# Patient Record
Sex: MALE | Race: OTHER | HISPANIC OR LATINO | URBAN - METROPOLITAN AREA
[De-identification: names, ages, dates, MRNs, and addresses within clinical notes are randomized per-mention and may not be internally consistent; named-entity substitution may affect disease eponyms.]

---

## 2020-12-10 ENCOUNTER — EMERGENCY (EMERGENCY)
Facility: HOSPITAL | Age: 45
LOS: 1 days | Discharge: HOME | End: 2020-12-10
Attending: EMERGENCY MEDICINE | Admitting: EMERGENCY MEDICINE
Payer: MEDICAID

## 2020-12-10 VITALS
TEMPERATURE: 98 F | HEART RATE: 95 BPM | OXYGEN SATURATION: 98 % | SYSTOLIC BLOOD PRESSURE: 181 MMHG | RESPIRATION RATE: 16 BRPM | DIASTOLIC BLOOD PRESSURE: 99 MMHG

## 2020-12-10 VITALS — SYSTOLIC BLOOD PRESSURE: 198 MMHG | DIASTOLIC BLOOD PRESSURE: 113 MMHG

## 2020-12-10 PROCEDURE — 99282 EMERGENCY DEPT VISIT SF MDM: CPT

## 2020-12-10 RX ORDER — ACETAMINOPHEN 500 MG
650 TABLET ORAL ONCE
Refills: 0 | Status: COMPLETED | OUTPATIENT
Start: 2020-12-10 | End: 2020-12-10

## 2020-12-10 RX ORDER — LIDOCAINE 4 G/100G
1 CREAM TOPICAL ONCE
Refills: 0 | Status: COMPLETED | OUTPATIENT
Start: 2020-12-10 | End: 2020-12-10

## 2020-12-10 RX ADMIN — Medication 650 MILLIGRAM(S): at 14:12

## 2020-12-10 RX ADMIN — LIDOCAINE 1 PATCH: 4 CREAM TOPICAL at 14:12

## 2020-12-10 NOTE — ED PROVIDER NOTE - PATIENT PORTAL LINK FT
You can access the FollowMyHealth Patient Portal offered by [facility] by registering at the following website: [email or web address]. By joining [facility]’s FollowMyHealth portal, you will also be able to view your health information using other applications (apps) compatible with our system.

## 2020-12-10 NOTE — ED PROVIDER NOTE - NSFOLLOWUPINSTRUCTIONS_ED_ALL_ED_FT
1) Follow up with your doctor within 1-3 days of being seen in the Emergency Department   2) Return to the ED immediately for new or worsening symptoms sudden severe headache, blurry vision, difficulty walking, weakness of one side of the body, slurred speech  3) Please continue to take any home medications as prescribed  4) Your test results from your ED visit were discussed with you prior to discharge  5) You were provided with a copy of your test results  6) You can buy Salonpas Lidocaine Patches over the counter at the pharmacy. Please take Tylenol for headache also.

## 2020-12-10 NOTE — ED ADULT NURSE NOTE - OBJECTIVE STATEMENT
lunch cov: pt received to 10B, c.o. pain going from behind lt ear down into neck. denies trauma and fevers, medicated as ordered. pt ambulatory in area

## 2020-12-10 NOTE — ED PROVIDER NOTE - CLINICAL SUMMARY MEDICAL DECISION MAKING FREE TEXT BOX
Li: 2 days L neck pain starting while he was sleeping. Associated with mild posterior HA. Alert and oriented, no gross motor or sensory deficits. Unlikely neurovascular event. Has been exercising more (weights). Likely muscle spasm/pain. Give pain meds. Re-assess.

## 2020-12-10 NOTE — ED ADULT TRIAGE NOTE - CHIEF COMPLAINT QUOTE
pt c/o left sided neck pain that started two days ago. Pt states he also had a headache but the headache went away at this time. Pt only feels the pain when he touches his neck. Pt sent from urgent care for further eval. PMH: HTN (pt took 3 different meds for his BP this AM).

## 2020-12-10 NOTE — ED PROVIDER NOTE - OBJECTIVE STATEMENT
45 year old male with a pmhx of HTN and HLD, is sent to ED from Urgent Care for evaluation of headache/neck pain x2 days. Patient reports symptoms began after exercising and lifting weights at a gym. The  report states they were concerned with the elevated BP. Patient has a hx of HTN and is on 3 BP meds. He states he has been taking all his meds as prescribed. He denies any blurry vision, weakness on 1 side or the other, chest pain, sob, dizziness, or difficulty walking.

## 2020-12-10 NOTE — ED PROVIDER NOTE - NS ED ROS FT
General: no fever, no chills  Eyes: no vision changes, no eye pain  Cardiovascular: no chest pain, no edema  Respiratory: no cough, no shortness of breath  Gastrointestinal: no abdominal pain, no nausea, no vomiting, no diarrhea  Genitourinary: no dysuria, no hematuria  Musculoskeletal: no muscle pain, no joint pain  Skin: no rash, no lesions  Neuro: no numbness, no tingling, +HA, neck pain  Psych: no depression, no anxiety

## 2020-12-10 NOTE — ED PROVIDER NOTE - ATTENDING CONTRIBUTION TO CARE
I performed a face-to-face evaluation of the patient and performed a history and physical examination. I agree with the history and physical examination.    2 days L neck pain starting while he was sleeping. Associated with mild posterior HA. Alert and oriented, no gross motor or sensory deficits. Unlikely neurovascular event. Has been exercising more (weights). Likely muscle spasm/pain. Give pain meds. Re-assess.

## 2020-12-10 NOTE — ED PROVIDER NOTE - PHYSICAL EXAMINATION
General: well appearing, no acute distress, AOx3  Skin: normal color for race, no rash  Head: normocephalic, atraumatic  Eyes: clear conjunctiva, EOMI  ENMT: airway patent, no nasal discharge  Cardiovascular: normal rate, normal rhythm, S1/S2  Pulmonary: clear to auscultation bilaterally, no rales, rhonchi, or wheeze  Abdomen: soft, nontender  Musculoskeletal: moving extremities well, no deformity, point tenderness at left lateral scalene, pain with looking left. normal ROM of neck in all directions. no meningeal sign   Neuro: CN II-XII intact, 5/5 strength b/l upper and lower extremities, normal gait   Psych: normal mood, normal affect

## 2020-12-10 NOTE — ED PROVIDER NOTE - PROGRESS NOTE DETAILS
Navin Kim, PGY-1- Patient improved after Tylenol and Lidoderm patch. Will d/c home. Strict return precautions given. All questions answered regarding plan Navin Kim, PGY-1- Patient revitaled numerous times, BP elevated, patient reporting this is not unusual for him. Patient took home meds. Advised to f/u with cardiologist. Feels well, no neuro deficits, neck pain/headache improved. Will d/c patient. Strict return precautions given

## 2021-11-30 ENCOUNTER — INPATIENT (INPATIENT)
Facility: HOSPITAL | Age: 46
LOS: 2 days | Discharge: HOME | End: 2021-12-03
Attending: INTERNAL MEDICINE | Admitting: INTERNAL MEDICINE
Payer: MEDICAID

## 2021-11-30 VITALS
SYSTOLIC BLOOD PRESSURE: 167 MMHG | RESPIRATION RATE: 18 BRPM | OXYGEN SATURATION: 100 % | HEART RATE: 90 BPM | TEMPERATURE: 98 F | DIASTOLIC BLOOD PRESSURE: 95 MMHG

## 2021-11-30 LAB
ALBUMIN SERPL ELPH-MCNC: 4.3 G/DL — SIGNIFICANT CHANGE UP (ref 3.3–5)
ALP SERPL-CCNC: 75 U/L — SIGNIFICANT CHANGE UP (ref 40–120)
ALT FLD-CCNC: 32 U/L — SIGNIFICANT CHANGE UP (ref 4–41)
ANION GAP SERPL CALC-SCNC: 9 MMOL/L — SIGNIFICANT CHANGE UP (ref 7–14)
AST SERPL-CCNC: 28 U/L — SIGNIFICANT CHANGE UP (ref 4–40)
BASOPHILS # BLD AUTO: 0.05 K/UL — SIGNIFICANT CHANGE UP (ref 0–0.2)
BASOPHILS NFR BLD AUTO: 0.8 % — SIGNIFICANT CHANGE UP (ref 0–2)
BILIRUB SERPL-MCNC: 0.6 MG/DL — SIGNIFICANT CHANGE UP (ref 0.2–1.2)
BUN SERPL-MCNC: 14 MG/DL — SIGNIFICANT CHANGE UP (ref 7–23)
CALCIUM SERPL-MCNC: 9.3 MG/DL — SIGNIFICANT CHANGE UP (ref 8.4–10.5)
CHLORIDE SERPL-SCNC: 103 MMOL/L — SIGNIFICANT CHANGE UP (ref 98–107)
CO2 SERPL-SCNC: 27 MMOL/L — SIGNIFICANT CHANGE UP (ref 22–31)
CREAT SERPL-MCNC: 1.22 MG/DL — SIGNIFICANT CHANGE UP (ref 0.5–1.3)
D DIMER BLD IA.RAPID-MCNC: <150 NG/ML DDU — SIGNIFICANT CHANGE UP
EOSINOPHIL # BLD AUTO: 0.14 K/UL — SIGNIFICANT CHANGE UP (ref 0–0.5)
EOSINOPHIL NFR BLD AUTO: 2.2 % — SIGNIFICANT CHANGE UP (ref 0–6)
GLUCOSE SERPL-MCNC: 103 MG/DL — HIGH (ref 70–99)
HCT VFR BLD CALC: 46.5 % — SIGNIFICANT CHANGE UP (ref 39–50)
HGB BLD-MCNC: 15.7 G/DL — SIGNIFICANT CHANGE UP (ref 13–17)
IANC: 4 K/UL — SIGNIFICANT CHANGE UP (ref 1.5–8.5)
IMM GRANULOCYTES NFR BLD AUTO: 0.3 % — SIGNIFICANT CHANGE UP (ref 0–1.5)
LYMPHOCYTES # BLD AUTO: 1.5 K/UL — SIGNIFICANT CHANGE UP (ref 1–3.3)
LYMPHOCYTES # BLD AUTO: 23.2 % — SIGNIFICANT CHANGE UP (ref 13–44)
MCHC RBC-ENTMCNC: 27.2 PG — SIGNIFICANT CHANGE UP (ref 27–34)
MCHC RBC-ENTMCNC: 33.8 GM/DL — SIGNIFICANT CHANGE UP (ref 32–36)
MCV RBC AUTO: 80.6 FL — SIGNIFICANT CHANGE UP (ref 80–100)
MONOCYTES # BLD AUTO: 0.75 K/UL — SIGNIFICANT CHANGE UP (ref 0–0.9)
MONOCYTES NFR BLD AUTO: 11.6 % — SIGNIFICANT CHANGE UP (ref 2–14)
NEUTROPHILS # BLD AUTO: 4 K/UL — SIGNIFICANT CHANGE UP (ref 1.8–7.4)
NEUTROPHILS NFR BLD AUTO: 61.9 % — SIGNIFICANT CHANGE UP (ref 43–77)
NRBC # BLD: 0 /100 WBCS — SIGNIFICANT CHANGE UP
NRBC # FLD: 0 K/UL — SIGNIFICANT CHANGE UP
NT-PROBNP SERPL-SCNC: 314 PG/ML — HIGH
PLATELET # BLD AUTO: 179 K/UL — SIGNIFICANT CHANGE UP (ref 150–400)
POTASSIUM SERPL-MCNC: 3.3 MMOL/L — LOW (ref 3.5–5.3)
POTASSIUM SERPL-SCNC: 3.3 MMOL/L — LOW (ref 3.5–5.3)
PROT SERPL-MCNC: 6.6 G/DL — SIGNIFICANT CHANGE UP (ref 6–8.3)
RBC # BLD: 5.77 M/UL — SIGNIFICANT CHANGE UP (ref 4.2–5.8)
RBC # FLD: 13.7 % — SIGNIFICANT CHANGE UP (ref 10.3–14.5)
SODIUM SERPL-SCNC: 139 MMOL/L — SIGNIFICANT CHANGE UP (ref 135–145)
TROPONIN T, HIGH SENSITIVITY RESULT: 21 NG/L — SIGNIFICANT CHANGE UP
WBC # BLD: 6.46 K/UL — SIGNIFICANT CHANGE UP (ref 3.8–10.5)
WBC # FLD AUTO: 6.46 K/UL — SIGNIFICANT CHANGE UP (ref 3.8–10.5)

## 2021-11-30 PROCEDURE — 71045 X-RAY EXAM CHEST 1 VIEW: CPT | Mod: 26

## 2021-11-30 PROCEDURE — 99220: CPT

## 2021-11-30 NOTE — ED PROVIDER NOTE - CLINICAL SUMMARY MEDICAL DECISION MAKING FREE TEXT BOX
46 year old male with htn, hyperlipidemia and chest pain that radiates to the back since yesterday, abnormal ekg, will check labs and get imaging.

## 2021-11-30 NOTE — ED CDU PROVIDER INITIAL DAY NOTE - ATTENDING CONTRIBUTION TO CARE
46 year old male with abnormal ekg and chest pain, placed in observation for echo and stress test with cardiology consultation.

## 2021-11-30 NOTE — ED ADULT NURSE NOTE - OBJECTIVE STATEMENT
Received patient at shift change from coni RN. Pt. A&OX4, ambulatory. Pt. presented to the ED for chest pain that has now resolved. PHx HTN and HLD. NSR on cardiac monitor. 18 G IV to R AC noted placed by dayshift RN. VSS. Respirations appear unlabored.

## 2021-11-30 NOTE — ED ADULT TRIAGE NOTE - CHIEF COMPLAINT QUOTE
Pt states has intermittent left side chest pain describes it as a pinching feeling. pt denies fever, sob ,cough.

## 2021-11-30 NOTE — ED CDU PROVIDER INITIAL DAY NOTE - MEDICAL DECISION MAKING DETAILS
46 year old male that has a history of hypertension and hyperlipidemia came to the ED because of chest pain. He started to have chest pain at 1pm yesterday and describes it as a stabbing pain that lasted until 11PM and than resolved. Pt is currently pain free. EKG in the ED with lateral T wave inversions. Plan is CDU for telemetry monitoring and an AM stress test and echocardiogram.

## 2021-11-30 NOTE — ED CDU PROVIDER INITIAL DAY NOTE - NSICDXFAMILYHX_GEN_ALL_CORE_FT
FAMILY HISTORY:  Father  Still living? Unknown  FH: hypertension, Age at diagnosis: Age Unknown    Mother  Still living? Unknown  FH: hypertension, Age at diagnosis: Age Unknown

## 2021-11-30 NOTE — ED CDU PROVIDER INITIAL DAY NOTE - OBJECTIVE STATEMENT
46 year old male that has a history of hypertension and hyperlipidemia came to the ED because of chest pain. He started to have chest pain at 1pm yesterday and describes it as a stabbing pain that lasted until 11PM and than resolved. He says it radiated to his back. The pain started again today 1 hour prior to arrival. No sob, no diaphoresis, no nausea, no vomiting, no weakness. He is pain free now.    CDU IKE Conner: Agree with above, spoke to pt with interpretor [id number]. 45 Y/O M PMH HTN HLD C/O CP which began at rest yesterday at 1PM and lasted until 11. Pt states he does not follow with a Cardiologist and has not had a stress test or echo before. EKG with inferior T wave inversions, troponin is stable. Plan is CDU for an AM stress test, pt is currently pain free.

## 2021-11-30 NOTE — ED PROVIDER NOTE - OBJECTIVE STATEMENT
#[id number] utilized. 46 year old male that has a history of hypertension and hyperlipidemia came to the ED because of chest pain. He started to have chest pain at 1pm yesterday and describes it as a stabbing pain that lasted until 11PM and than resolved. He says it radiated to his back. The pain started again today 1 hour prior to arrival. No sob, no diaphoresis, no nausea, no vomiting, no weakness. He is pain free now.

## 2021-12-01 ENCOUNTER — OUTPATIENT (OUTPATIENT)
Dept: OUTPATIENT SERVICES | Facility: HOSPITAL | Age: 46
LOS: 1 days | End: 2021-12-01
Payer: MEDICAID

## 2021-12-01 DIAGNOSIS — R94.39 ABNORMAL RESULT OF OTHER CARDIOVASCULAR FUNCTION STUDY: ICD-10-CM

## 2021-12-01 DIAGNOSIS — I10 ESSENTIAL (PRIMARY) HYPERTENSION: ICD-10-CM

## 2021-12-01 DIAGNOSIS — Z29.9 ENCOUNTER FOR PROPHYLACTIC MEASURES, UNSPECIFIED: ICD-10-CM

## 2021-12-01 DIAGNOSIS — R07.9 CHEST PAIN, UNSPECIFIED: ICD-10-CM

## 2021-12-01 PROBLEM — E78.5 HYPERLIPIDEMIA, UNSPECIFIED: Chronic | Status: ACTIVE | Noted: 2020-12-10

## 2021-12-01 LAB
ALBUMIN SERPL ELPH-MCNC: 4.3 G/DL — SIGNIFICANT CHANGE UP (ref 3.3–5)
ALP SERPL-CCNC: 68 U/L — SIGNIFICANT CHANGE UP (ref 40–120)
ALT FLD-CCNC: 29 U/L — SIGNIFICANT CHANGE UP (ref 4–41)
ANION GAP SERPL CALC-SCNC: 9 MMOL/L — SIGNIFICANT CHANGE UP (ref 7–14)
AST SERPL-CCNC: 25 U/L — SIGNIFICANT CHANGE UP (ref 4–40)
BILIRUB SERPL-MCNC: 0.6 MG/DL — SIGNIFICANT CHANGE UP (ref 0.2–1.2)
BUN SERPL-MCNC: 16 MG/DL — SIGNIFICANT CHANGE UP (ref 7–23)
CALCIUM SERPL-MCNC: 9.1 MG/DL — SIGNIFICANT CHANGE UP (ref 8.4–10.5)
CHLORIDE SERPL-SCNC: 104 MMOL/L — SIGNIFICANT CHANGE UP (ref 98–107)
CO2 SERPL-SCNC: 27 MMOL/L — SIGNIFICANT CHANGE UP (ref 22–31)
CREAT SERPL-MCNC: 1.47 MG/DL — HIGH (ref 0.5–1.3)
GLUCOSE SERPL-MCNC: 95 MG/DL — SIGNIFICANT CHANGE UP (ref 70–99)
HCT VFR BLD CALC: 47.1 % — SIGNIFICANT CHANGE UP (ref 39–50)
HGB BLD-MCNC: 15.7 G/DL — SIGNIFICANT CHANGE UP (ref 13–17)
MCHC RBC-ENTMCNC: 27.2 PG — SIGNIFICANT CHANGE UP (ref 27–34)
MCHC RBC-ENTMCNC: 33.3 GM/DL — SIGNIFICANT CHANGE UP (ref 32–36)
MCV RBC AUTO: 81.5 FL — SIGNIFICANT CHANGE UP (ref 80–100)
NRBC # BLD: 0 /100 WBCS — SIGNIFICANT CHANGE UP
NRBC # FLD: 0 K/UL — SIGNIFICANT CHANGE UP
PLATELET # BLD AUTO: 180 K/UL — SIGNIFICANT CHANGE UP (ref 150–400)
POTASSIUM SERPL-MCNC: 3.6 MMOL/L — SIGNIFICANT CHANGE UP (ref 3.5–5.3)
POTASSIUM SERPL-SCNC: 3.6 MMOL/L — SIGNIFICANT CHANGE UP (ref 3.5–5.3)
PROT SERPL-MCNC: 6.3 G/DL — SIGNIFICANT CHANGE UP (ref 6–8.3)
RBC # BLD: 5.78 M/UL — SIGNIFICANT CHANGE UP (ref 4.2–5.8)
RBC # FLD: 13.8 % — SIGNIFICANT CHANGE UP (ref 10.3–14.5)
SARS-COV-2 RNA SPEC QL NAA+PROBE: SIGNIFICANT CHANGE UP
SODIUM SERPL-SCNC: 140 MMOL/L — SIGNIFICANT CHANGE UP (ref 135–145)
TROPONIN T, HIGH SENSITIVITY RESULT: 20 NG/L — SIGNIFICANT CHANGE UP
WBC # BLD: 6.13 K/UL — SIGNIFICANT CHANGE UP (ref 3.8–10.5)
WBC # FLD AUTO: 6.13 K/UL — SIGNIFICANT CHANGE UP (ref 3.8–10.5)

## 2021-12-01 PROCEDURE — G9005: CPT

## 2021-12-01 PROCEDURE — 99217: CPT

## 2021-12-01 PROCEDURE — 93306 TTE W/DOPPLER COMPLETE: CPT | Mod: 26

## 2021-12-01 PROCEDURE — 99223 1ST HOSP IP/OBS HIGH 75: CPT

## 2021-12-01 RX ORDER — HYDRALAZINE HCL 50 MG
100 TABLET ORAL EVERY 8 HOURS
Refills: 0 | Status: DISCONTINUED | OUTPATIENT
Start: 2021-12-01 | End: 2021-12-01

## 2021-12-01 RX ORDER — AMLODIPINE BESYLATE 2.5 MG/1
10 TABLET ORAL EVERY 24 HOURS
Refills: 0 | Status: DISCONTINUED | OUTPATIENT
Start: 2021-12-01 | End: 2021-12-02

## 2021-12-01 RX ORDER — ATORVASTATIN CALCIUM 80 MG/1
40 TABLET, FILM COATED ORAL AT BEDTIME
Refills: 0 | Status: DISCONTINUED | OUTPATIENT
Start: 2021-12-01 | End: 2021-12-02

## 2021-12-01 RX ORDER — HYDRALAZINE HCL 50 MG
100 TABLET ORAL EVERY 8 HOURS
Refills: 0 | Status: DISCONTINUED | OUTPATIENT
Start: 2021-12-01 | End: 2021-12-02

## 2021-12-01 RX ORDER — LISINOPRIL 2.5 MG/1
40 TABLET ORAL DAILY
Refills: 0 | Status: DISCONTINUED | OUTPATIENT
Start: 2021-12-01 | End: 2021-12-03

## 2021-12-01 RX ORDER — ACETAMINOPHEN 500 MG
650 TABLET ORAL EVERY 6 HOURS
Refills: 0 | Status: DISCONTINUED | OUTPATIENT
Start: 2021-12-01 | End: 2021-12-03

## 2021-12-01 RX ORDER — ASPIRIN/CALCIUM CARB/MAGNESIUM 324 MG
81 TABLET ORAL DAILY
Refills: 0 | Status: DISCONTINUED | OUTPATIENT
Start: 2021-12-01 | End: 2021-12-03

## 2021-12-01 RX ORDER — ASPIRIN/CALCIUM CARB/MAGNESIUM 324 MG
81 TABLET ORAL ONCE
Refills: 0 | Status: COMPLETED | OUTPATIENT
Start: 2021-12-01 | End: 2021-12-01

## 2021-12-01 RX ORDER — POTASSIUM CHLORIDE 20 MEQ
40 PACKET (EA) ORAL ONCE
Refills: 0 | Status: COMPLETED | OUTPATIENT
Start: 2021-12-01 | End: 2021-12-01

## 2021-12-01 RX ORDER — AMLODIPINE BESYLATE AND BENAZEPRIL HYDROCHLORIDE 10; 20 MG/1; MG/1
1 CAPSULE ORAL
Qty: 0 | Refills: 0 | DISCHARGE

## 2021-12-01 RX ORDER — LABETALOL HCL 100 MG
300 TABLET ORAL EVERY 24 HOURS
Refills: 0 | Status: DISCONTINUED | OUTPATIENT
Start: 2021-12-01 | End: 2021-12-02

## 2021-12-01 RX ADMIN — Medication 40 MILLIEQUIVALENT(S): at 12:31

## 2021-12-01 RX ADMIN — ATORVASTATIN CALCIUM 40 MILLIGRAM(S): 80 TABLET, FILM COATED ORAL at 22:52

## 2021-12-01 RX ADMIN — Medication 100 MILLIGRAM(S): at 22:52

## 2021-12-01 RX ADMIN — Medication 81 MILLIGRAM(S): at 14:05

## 2021-12-01 RX ADMIN — LISINOPRIL 40 MILLIGRAM(S): 2.5 TABLET ORAL at 23:02

## 2021-12-01 NOTE — H&P ADULT - PROBLEM SELECTOR PLAN 2
Pt with chest pain. Abnormal stress test. No chest pain currently. EKG with NSR.   - Pending left heart cath tomorrow   - C/w ASA

## 2021-12-01 NOTE — ED CDU PROVIDER SUBSEQUENT DAY NOTE - PROGRESS NOTE DETAILS
IKE Garcia: 45 y/o male with a hx of HTN, HLD presented to the ER c/o chest pain.  Pt was placed in CDU for stress, echo and Tele Doc.  Received call from stress lab pt with +stress test.  Pt seen and evaluated by cardiology Dr Andrew at bedside advised to admit to his service and to give aspirin 81mg x 1 now no further AC at this time.  Pt aware and agreeable with plan.  Pt resting comfortably NAD, pt denies chest pain or any complaints at present time.  Pt aware and agreeable with plan.  D/w pt via  ID# [id number].

## 2021-12-01 NOTE — H&P ADULT - NSHPPHYSICALEXAM_GEN_ALL_CORE
.  VITAL SIGNS:  T(C): 36.8 (12-01-21 @ 14:30), Max: 37.2 (12-01-21 @ 05:02)  T(F): 98.2 (12-01-21 @ 14:30), Max: 98.9 (12-01-21 @ 05:02)  HR: 72 (12-01-21 @ 14:30) (61 - 90)  BP: 212/110 (12-01-21 @ 14:30) (134/85 - 212/110)  BP(mean): --  RR: 16 (12-01-21 @ 14:30) (13 - 18)  SpO2: 99% (12-01-21 @ 14:30) (97% - 100%)  Wt(kg): --    PHYSICAL EXAM:    Constitutional: WDWN male resting comfortably in bed; NAD  Head: NC/AT  Eyes: PERRL, EOMI, anicteric sclera  ENT: no nasal discharge; uvula midline, no oropharyngeal erythema or exudates; MMM  Neck: supple; no JVD or thyromegaly  Respiratory: CTA B/L; no W/R/R, no retractions  Cardiac: +S1/S2; RRR; no M/R/G; PMI non-displaced  Gastrointestinal: abdomen soft, NT/ND; no rebound or guarding; +BSx4  Back: spine midline, no bony tenderness or step-offs; no CVAT B/L  Extremities: WWP, no clubbing or cyanosis; no peripheral edema  Musculoskeletal: NROM x4; no joint swelling, tenderness or erythema  Vascular: 2+ radial, femoral, DP/PT pulses B/L  Dermatologic: skin warm, dry and intact; no rashes, wounds, or scars  Lymphatic: no submandibular or cervical LAD  Neurologic: AAOx3; CNII-XII grossly intact; no focal deficits  Psychiatric: affect and characteristics of appearance, verbalizations, behaviors are appropriate

## 2021-12-01 NOTE — ED CDU PROVIDER DISPOSITION NOTE - ATTENDING CONTRIBUTION TO CARE
47 y/o male with a hx of HTN, HLD presented to the ER c/o chest pain.  Pt was placed in CDU for stress, echo and Tele Doc. echo wnl,  Received call from stress lab pt with +stress test.  Pt seen and evaluated by cardiology Dr Andrew at bedside advised to admit to his service and to give aspirin 81mg x 1 now no further AC at this time.  Pt aware and agreeable with plan.  Pt resting comfortably NAD, pt denies chest pain or any complaints at present time.  Pt aware and agreeable with plan.

## 2021-12-01 NOTE — H&P ADULT - NSHPREVIEWOFSYSTEMS_GEN_ALL_CORE
REVIEW OF SYSTEMS:    CONSTITUTIONAL: No weakness, fevers or chills  EYES/ENT: No visual changes;  No vertigo or throat pain   NECK: No pain or stiffness  RESPIRATORY: No cough, wheezing, hemoptysis; No shortness of breath  CARDIOVASCULAR: (+) chest pain, no palpitations.   GASTROINTESTINAL: No abdominal or epigastric pain. No nausea, vomiting, or hematemesis; No diarrhea or constipation. No melena or hematochezia.  GENITOURINARY: No dysuria, frequency or hematuria  NEUROLOGICAL: No numbness or weakness  SKIN: No itching, rashes

## 2021-12-01 NOTE — ED CDU PROVIDER SUBSEQUENT DAY NOTE - ATTENDING CONTRIBUTION TO CARE
46 year old male that has a history of hypertension and hyperlipidemia came to the ED because of chest pain. He started to have chest pain at 1pm yesterday and describes it as a stabbing pain that lasted until 11PM and than resolved. He says it radiated to his back. The pain started again today 1 hour prior to arrival. No sob, no diaphoresis, no nausea, no vomiting, no weakness. He is pain free now. trops negative, pending stress and echo in CDU, currently pain free

## 2021-12-01 NOTE — H&P ADULT - PROBLEM SELECTOR PLAN 1
Pt with an abnormal stress test with evidence of ischemia. Admitted for cardiac catheterization tomorrow.   - F/u cardiac recommendations.

## 2021-12-01 NOTE — H&P ADULT - NSHPLABSRESULTS_GEN_ALL_CORE
.  LABS:                         15.7   6.46  )-----------( 179      ( 30 Nov 2021 19:36 )             46.5     11-30    139  |  103  |  14  ----------------------------<  103<H>  3.3<L>   |  27  |  1.22    Ca    9.3      30 Nov 2021 19:35    TPro  6.6  /  Alb  4.3  /  TBili  0.6  /  DBili  x   /  AST  28  /  ALT  32  /  AlkPhos  75  11-30                  RADIOLOGY, EKG & ADDITIONAL TESTS: Reviewed.

## 2021-12-01 NOTE — H&P ADULT - PROBLEM SELECTOR PLAN 3
SBP in 200s likely due to not receiving home blood pressure medications since prior to admission.   - Restart home meds   - Hydralazine 100mg TID   - Labetalol 300mg qd   - Amlodipine/benzapril --> norvasc 10mg and lisinopril 40mg while inpatient  - F/u repeat blood pressure after he receives his medications.

## 2021-12-01 NOTE — CONSULT NOTE ADULT - SUBJECTIVE AND OBJECTIVE BOX
Chele Andrew MD  Interventional Cardiology / Endovascular Specialist  [place] Office : [address] [place] [address] [place]. [address]  Tel:   [place] Office : [address] [facility] [place] [address]  Tel: [phone]  Cell : [phone]    HISTORY OF PRESENTING ILLNESS:  46 year old male that has a history of hypertension and hyperlipidemia came to the ED because of chest pain. He started to have chest pain at 1pm yesterday and describes it as a stabbing pain that lasted until 11PM and than resolved. He says it radiated to his back. The pain started again today 1 hour prior to arrival. No sob, no diaphoresis, no nausea, no vomiting, no weakness. Echo with normal LV stage 1 diastolic dysfunction. Stress abnormal with evidence of ischemia. Plan for [facility] tomorrow.  ID # [id number]  	  MEDICATIONS:        acetaminophen     Tablet .. 650 milliGRAM(s) Oral every 6 hours PRN            PAST MEDICAL/SURGICAL HISTORY  PAST MEDICAL & SURGICAL HISTORY:  Hypertension, unspecified type    Hyperlipidemia, unspecified hyperlipidemia type    No significant past surgical history        SOCIAL HISTORY: Substance Use (street drugs): ( x ) never used  (  ) other:    FAMILY HISTORY:  FH: hypertension (Father, Mother)        REVIEW OF SYSTEMS:  CONSTITUTIONAL: No fever, weight loss, or fatigue  EYES: No eye pain, visual disturbances, or discharge  ENMT:  No difficulty hearing, tinnitus, vertigo; No sinus or throat pain  BREASTS: No pain, masses, or nipple discharge  GASTROINTESTINAL: No abdominal or epigastric pain. No nausea, vomiting, or hematemesis; No diarrhea or constipation. No melena or hematochezia.  GENITOURINARY: No dysuria, frequency, hematuria, or incontinence  NEUROLOGICAL: No headaches, memory loss, loss of strength, numbness, or tremors  ENDOCRINE: No heat or cold intolerance; No hair loss  MUSCULOSKELETAL: No joint pain or swelling; No muscle, back, or extremity pain  PSYCHIATRIC: No depression, anxiety, mood swings, or difficulty sleeping  HEME/LYMPH: No easy bruising, or bleeding gums  All others negative    PHYSICAL EXAM:  T(C): 36.8 (12-01-21 @ 14:30), Max: 37.2 (12-01-21 @ 05:02)  HR: 72 (12-01-21 @ 14:30) (61 - 90)  BP: 134/85 (12-01-21 @ 10:04) (134/85 - 167/95)  RR: 16 (12-01-21 @ 14:30) (13 - 18)  SpO2: 99% (12-01-21 @ 14:30) (97% - 100%)  Wt(kg): --  I&O's Summary        GENERAL: NAD  EYES: EOMI, PERRLA, conjunctiva and sclera clear  ENMT: No tonsillar erythema, exudates, or enlargement  Cardiovascular: Normal S1 S2, No JVD, No murmurs, No edema  Respiratory: Lungs clear to auscultation	  Gastrointestinal:  Soft, Non-tender, + BS	  Extremities: No edema                                      15.7   6.46  )-----------( 179      ( 30 Nov 2021 19:36 )             46.5     11-30    139  |  103  |  14  ----------------------------<  103<H>  3.3<L>   |  27  |  1.22    Ca    9.3      30 Nov 2021 19:35    TPro  6.6  /  Alb  4.3  /  TBili  0.6  /  DBili  x   /  AST  28  /  ALT  32  /  AlkPhos  75  11-30    proBNP: Serum Pro-Brain Natriuretic Peptide: 314 pg/mL (11-30 @ 19:35)    Lipid Profile:   HgA1c:   TSH:     Consultant(s) Notes Reviewed:  [x ] YES  [ ] NO    Care Discussed with Consultants/Other Providers [ x] YES  [ ] NO    Imaging Personally Reviewed independently:  [x] YES  [ ] NO    All labs, radiologic studies, vitals, orders and medications list reviewed. Patient is seen and examined at bedside. Case discussed with medical team.

## 2021-12-01 NOTE — ED CDU PROVIDER DISPOSITION NOTE - CLINICAL COURSE
IKE Garcia: 47 y/o male with a hx of HTN, HLD presented to the ER c/o chest pain.  Pt was placed in CDU for stress, echo and Tele Doc.  Received call from stress lab pt with +stress test.  Pt seen and evaluated by cardiology Dr Andrew at bedside advised to admit to his service and to give aspirin 81mg x 1 now no further AC at this time.  Pt aware and agreeable with plan.  Pt resting comfortably NAD, pt denies chest pain or any complaints at present time.  Pt aware and agreeable with plan.  D/w pt via  ID# [id number].

## 2021-12-01 NOTE — ED CDU PROVIDER SUBSEQUENT DAY NOTE - HISTORY
46 year old male that has a history of hypertension and hyperlipidemia came to the ED because of chest pain. He started to have chest pain at 1pm yesterday and describes it as a stabbing pain that lasted until 11PM and than resolved. He says it radiated to his back. The pain started again today 1 hour prior to arrival. No sob, no diaphoresis, no nausea, no vomiting, no weakness. He is pain free now.    CDU IKE Conner: Agree with above, spoke to pt with interpretor [id number]. 47 Y/O M PMH HTN HLD C/O CP which began at rest yesterday at 1PM and lasted until 11. Pt states he does not follow with a Cardiologist and has not had a stress test or echo before. EKG with inferior T wave inversions, troponin is stable. Plan is CDU for an AM stress test, pt is currently pain free. Will continue to monitor, pt is resting comfortably, awaiting AM echo and stress. A dissection study was performed in the ED which was negative.

## 2021-12-01 NOTE — H&P ADULT - HISTORY OF PRESENT ILLNESS
47 y/o M with PMH HTN and HLD who presents for left sided chest pain. Patient reports chest pain that has been ongoing for months but was progressively worsening. Pain felt like someone was pinching him and would come and go intermittently. The pain was not associated with exertion. He took Tylenol and aspirin which he says relieved the pain. No diaphoresis/dizziness/lightheadedness. No fevers/chills. No cough/SOB. All other ROS negative. Patient was admitted to the CDU for a stress test which was positive. Now awaiting further evaluation. At this time, he has been chest pain free and is comfortable.

## 2021-12-01 NOTE — H&P ADULT - ASSESSMENT
47 y/o M with PMH HTN and HLD who presents for left sided chest pain found to have echo with normal LV stage 1 diastolic dysfunction and abnormal stress test with evidence of ischemia, now admitted for cardiac cath.

## 2021-12-01 NOTE — CHART NOTE - NSCHARTNOTEFT_GEN_A_CORE
Patient blood pressure elevated 212/110, has not taken his blood pressure medications today. RN called to notify pt took 2 of his own home meds at the bedside. Went to see pt, pt took his own hydralazine and labetalol. Counseled pt not to take any of his own home meds while in the hospital, that we will give him all his medications and had we need to know what he is taking, he verbalized he understands. will continue to monitor his blood pressure.

## 2021-12-01 NOTE — ED CDU PROVIDER SUBSEQUENT DAY NOTE - MEDICAL DECISION MAKING DETAILS
46 year old male that has a history of hypertension and hyperlipidemia came to the ED because of chest pain. He started to have chest pain at 1pm yesterday and describes it as a stabbing pain that lasted until 11PM and than resolved. He says it radiated to his back. CTA is neg for dissection, pt is currently resting comfortably. Will continue to monitor on telemetry, pending an AM stress test and echocardiogram.

## 2021-12-02 LAB
ANION GAP SERPL CALC-SCNC: 11 MMOL/L — SIGNIFICANT CHANGE UP (ref 7–14)
BUN SERPL-MCNC: 13 MG/DL — SIGNIFICANT CHANGE UP (ref 7–23)
CALCIUM SERPL-MCNC: 9.1 MG/DL — SIGNIFICANT CHANGE UP (ref 8.4–10.5)
CHLORIDE SERPL-SCNC: 103 MMOL/L — SIGNIFICANT CHANGE UP (ref 98–107)
CO2 SERPL-SCNC: 23 MMOL/L — SIGNIFICANT CHANGE UP (ref 22–31)
CREAT SERPL-MCNC: 1.32 MG/DL — HIGH (ref 0.5–1.3)
GLUCOSE SERPL-MCNC: 77 MG/DL — SIGNIFICANT CHANGE UP (ref 70–99)
HCT VFR BLD CALC: 49.7 % — SIGNIFICANT CHANGE UP (ref 39–50)
HGB BLD-MCNC: 15.7 G/DL — SIGNIFICANT CHANGE UP (ref 13–17)
MCHC RBC-ENTMCNC: 26.6 PG — LOW (ref 27–34)
MCHC RBC-ENTMCNC: 31.6 GM/DL — LOW (ref 32–36)
MCV RBC AUTO: 84.2 FL — SIGNIFICANT CHANGE UP (ref 80–100)
NRBC # BLD: 0 /100 WBCS — SIGNIFICANT CHANGE UP
NRBC # FLD: 0 K/UL — SIGNIFICANT CHANGE UP
PLATELET # BLD AUTO: 176 K/UL — SIGNIFICANT CHANGE UP (ref 150–400)
POTASSIUM SERPL-MCNC: 3.4 MMOL/L — LOW (ref 3.5–5.3)
POTASSIUM SERPL-SCNC: 3.4 MMOL/L — LOW (ref 3.5–5.3)
RBC # BLD: 5.9 M/UL — HIGH (ref 4.2–5.8)
RBC # FLD: 13.8 % — SIGNIFICANT CHANGE UP (ref 10.3–14.5)
SODIUM SERPL-SCNC: 137 MMOL/L — SIGNIFICANT CHANGE UP (ref 135–145)
WBC # BLD: 5.56 K/UL — SIGNIFICANT CHANGE UP (ref 3.8–10.5)
WBC # FLD AUTO: 5.56 K/UL — SIGNIFICANT CHANGE UP (ref 3.8–10.5)

## 2021-12-02 PROCEDURE — 93010 ELECTROCARDIOGRAM REPORT: CPT

## 2021-12-02 RX ORDER — CARVEDILOL PHOSPHATE 80 MG/1
12.5 CAPSULE, EXTENDED RELEASE ORAL
Refills: 0 | Status: DISCONTINUED | OUTPATIENT
Start: 2021-12-02 | End: 2021-12-03

## 2021-12-02 RX ORDER — ATORVASTATIN CALCIUM 80 MG/1
80 TABLET, FILM COATED ORAL AT BEDTIME
Refills: 0 | Status: DISCONTINUED | OUTPATIENT
Start: 2021-12-02 | End: 2021-12-03

## 2021-12-02 RX ORDER — POTASSIUM CHLORIDE 20 MEQ
40 PACKET (EA) ORAL ONCE
Refills: 0 | Status: COMPLETED | OUTPATIENT
Start: 2021-12-02 | End: 2021-12-02

## 2021-12-02 RX ORDER — LABETALOL HCL 100 MG
300 TABLET ORAL EVERY 24 HOURS
Refills: 0 | Status: DISCONTINUED | OUTPATIENT
Start: 2021-12-02 | End: 2021-12-02

## 2021-12-02 RX ORDER — AMLODIPINE BESYLATE 2.5 MG/1
10 TABLET ORAL EVERY 24 HOURS
Refills: 0 | Status: DISCONTINUED | OUTPATIENT
Start: 2021-12-02 | End: 2021-12-03

## 2021-12-02 RX ORDER — TICAGRELOR 90 MG/1
90 TABLET ORAL EVERY 12 HOURS
Refills: 0 | Status: DISCONTINUED | OUTPATIENT
Start: 2021-12-03 | End: 2021-12-03

## 2021-12-02 RX ORDER — SODIUM CHLORIDE 9 MG/ML
1000 INJECTION INTRAMUSCULAR; INTRAVENOUS; SUBCUTANEOUS
Refills: 0 | Status: DISCONTINUED | OUTPATIENT
Start: 2021-12-02 | End: 2021-12-03

## 2021-12-02 RX ADMIN — CARVEDILOL PHOSPHATE 12.5 MILLIGRAM(S): 80 CAPSULE, EXTENDED RELEASE ORAL at 19:51

## 2021-12-02 RX ADMIN — SODIUM CHLORIDE 100 MILLILITER(S): 9 INJECTION INTRAMUSCULAR; INTRAVENOUS; SUBCUTANEOUS at 19:34

## 2021-12-02 RX ADMIN — Medication 81 MILLIGRAM(S): at 10:28

## 2021-12-02 RX ADMIN — AMLODIPINE BESYLATE 10 MILLIGRAM(S): 2.5 TABLET ORAL at 04:01

## 2021-12-02 RX ADMIN — Medication 40 MILLIEQUIVALENT(S): at 09:56

## 2021-12-02 RX ADMIN — Medication 100 MILLIGRAM(S): at 05:00

## 2021-12-02 RX ADMIN — ATORVASTATIN CALCIUM 80 MILLIGRAM(S): 80 TABLET, FILM COATED ORAL at 21:39

## 2021-12-02 RX ADMIN — Medication 300 MILLIGRAM(S): at 07:47

## 2021-12-02 RX ADMIN — LISINOPRIL 40 MILLIGRAM(S): 2.5 TABLET ORAL at 05:00

## 2021-12-02 NOTE — PROGRESS NOTE ADULT - ASSESSMENT
46 year old male that has a history of hypertension and hyperlipidemia came to the ED because of chest pain.    EKG: NSR TWI lateral leads. LVH    1. Chest pain  -trops 21--20  -CXR clear   -CTA chest negative  -echo with normal LV function and stage 1 diastolic dysfunction  -stress abnormal with evidence of ischemia.   -s/p LHC with PCI to OM. c/w asa and brilinta and atorvastatin 80mg    2. Hypokalemia  -s/p supplementation    3. HTN  -elevated  -hydral d/graham  -labetalol switched to coreg 12.5mg BID, uptitrate as needed  -c/w norvasc 10mg and lisinopril 40mg  -continue to monitor BP

## 2021-12-02 NOTE — CONSULT NOTE ADULT - ASSESSMENT
45 y/o M with PMH HTN and HLD who presents for left sided chest pain. Patient was admitted to the CDU for a stress test which was positive. s/p cath and PCI 12/2. nephrology consulted for elevated scr and htn    elevated scr  relatively stable   no hx of ckd per patient  hx of uncontrolled HTN for >5 years. multiple ed visit for uncontrolled htn per patient  check UA, urine cr, na  continue ACEI for now  continue IVF  s/p cath. monitor for CELESTE  avoid nephrotoxic agents    HTN  uncontrolled htn for many years, multiple visit to ED for htn  given age, hypokalemia. will r/o secondary cause  check renin, aldosterone, metanephrine, renal duplex   pt advised to f/u with dr andujar in 1-2 wks   currently acceptable bp  on norvasc 10, carvedilol 12.5 bid, lisinopril 40 daily  continue and monitor     hypokalemia  supplement  monitor  
46 year old male that has a history of hypertension and hyperlipidemia came to the ED because of chest pain.    EKG: NSR TWI lateral leads. LVH    1. Chest pain  -trops 21--20  -CXR clear   -CTA chest negative  -echo with normal LV function and stage 1 diastolic dysfunction  -stress abnormal with evidence of ischemia. plan for C tomorrow. asa 81mg started    2. Hypokalemia  -on labs 11/30  -f/u labs today---ordered    3. HTN  -controlled  -continue to monitor BP

## 2021-12-02 NOTE — CONSULT NOTE ADULT - SUBJECTIVE AND OBJECTIVE BOX
[facility]   MD ANTHONY HEREDIA PA        TEL:  OFFICE: [phone]  DR BONDS CELL: [phone]  DR. ROSALES CELL: [phone]  AR MURRAY CELL: [phone]    From 5pm-7am answering service [phone]    --- INITIAL RENAL CONSULT NOTE ---date of service 12-02-21 @ 17:07    HPI:  45 y/o M with PMH HTN and HLD who presents for left sided chest pain. Patient reports chest pain that has been ongoing for months but was progressively worsening. Pain felt like someone was pinching him and would come and go intermittently. The pain was not associated with exertion. He took Tylenol and aspirin which he says relieved the pain. No diaphoresis/dizziness/lightheadedness. No fevers/chills. No cough/SOB. All other ROS negative. Patient was admitted to the CDU for a stress test which was positive. s/p cath and PCI 12/2. nephrology consulted for elevated scr and htn        Allergies:  No Known Allergies      PAST MEDICAL & SURGICAL HISTORY:  Hypertension, unspecified type    Hyperlipidemia, unspecified hyperlipidemia type    No significant past surgical history        Home Medications Reviewed    Hospital Medications:   MEDICATIONS  (STANDING):  amLODIPine   Tablet 10 milliGRAM(s) Oral every 24 hours  aspirin  chewable 81 milliGRAM(s) Oral daily  atorvastatin 80 milliGRAM(s) Oral at bedtime  carvedilol 12.5 milliGRAM(s) Oral two times a day  lisinopril 40 milliGRAM(s) Oral daily  sodium chloride 0.9%. 1000 milliLiter(s) (100 mL/Hr) IV Continuous <Continuous>      SOCIAL HISTORY:  Denies ETOh, Smoking,     FAMILY HISTORY:  FH: hypertension (Father, Mother)        REVIEW OF SYSTEMS:  CONSTITUTIONAL: No weakness, fevers or chills  EYES/ENT: No visual changes;  No vertigo or throat pain   NECK: No pain or stiffness  RESPIRATORY: No cough, wheezing, hemoptysis; No shortness of breath  CARDIOVASCULAR: see hpi  GASTROINTESTINAL: No abdominal or epigastric pain. No nausea, vomiting, or hematemesis; No diarrhea or constipation. No melena or hematochezia.  GENITOURINARY: No dysuria, frequency, foamy urine, urinary urgency, incontinence or hematuria  NEUROLOGICAL: No numbness or weakness  SKIN: No itching, burning, rashes, or lesions   VASCULAR: No bilateral lower extremity edema.   All other review of systems is negative unless indicated above.    VITALS:  T(F): 98.6 (12-02-21 @ 10:03), Max: 98.7 (12-02-21 @ 04:58)  HR: 91 (12-02-21 @ 10:03)  BP: 150/89 (12-02-21 @ 10:03)  RR: 17 (12-02-21 @ 10:03)  SpO2: 97% (12-02-21 @ 10:03)  Wt(kg): --        PHYSICAL EXAM:  Constitutional: NAD  HEENT: anicteric sclera, oropharynx clear, MMM  Neck: No JVD  Respiratory: CTAB, no wheezes, rales or rhonchi  Cardiovascular: S1, S2, RRR  Gastrointestinal: BS+, soft, NT/ND  Extremities: No cyanosis or clubbing. No peripheral edema  Neurological: A/O x 3, no focal deficits  Psychiatric: Normal mood, normal affect  : No CVA tenderness. No farnsworth.   Skin: No rashes  Vascular Access: none    LABS:  12-02    137  |  103  |  13  ----------------------------<  77  3.4<L>   |  23  |  1.32<H>    Ca    9.1      02 Dec 2021 07:32    TPro  6.3  /  Alb  4.3  /  TBili  0.6  /  DBili      /  AST  25  /  ALT  29  /  AlkPhos  68  12-01    Creatinine Trend: 1.32 <--, 1.47 <--, 1.22 <--                        15.7   5.56  )-----------( 176      ( 02 Dec 2021 07:32 )             49.7     Urine Studies:        RADIOLOGY & ADDITIONAL STUDIES:

## 2021-12-03 ENCOUNTER — TRANSCRIPTION ENCOUNTER (OUTPATIENT)
Age: 46
End: 2021-12-03

## 2021-12-03 VITALS
SYSTOLIC BLOOD PRESSURE: 169 MMHG | HEART RATE: 80 BPM | OXYGEN SATURATION: 99 % | TEMPERATURE: 98 F | RESPIRATION RATE: 16 BRPM | DIASTOLIC BLOOD PRESSURE: 91 MMHG

## 2021-12-03 LAB
ANION GAP SERPL CALC-SCNC: 13 MMOL/L — SIGNIFICANT CHANGE UP (ref 7–14)
BUN SERPL-MCNC: 14 MG/DL — SIGNIFICANT CHANGE UP (ref 7–23)
CALCIUM SERPL-MCNC: 8.9 MG/DL — SIGNIFICANT CHANGE UP (ref 8.4–10.5)
CHLORIDE SERPL-SCNC: 106 MMOL/L — SIGNIFICANT CHANGE UP (ref 98–107)
CO2 SERPL-SCNC: 22 MMOL/L — SIGNIFICANT CHANGE UP (ref 22–31)
CREAT SERPL-MCNC: 1.27 MG/DL — SIGNIFICANT CHANGE UP (ref 0.5–1.3)
GLUCOSE SERPL-MCNC: 75 MG/DL — SIGNIFICANT CHANGE UP (ref 70–99)
HCT VFR BLD CALC: 45.6 % — SIGNIFICANT CHANGE UP (ref 39–50)
HGB BLD-MCNC: 15.4 G/DL — SIGNIFICANT CHANGE UP (ref 13–17)
MAGNESIUM SERPL-MCNC: 2.1 MG/DL — SIGNIFICANT CHANGE UP (ref 1.6–2.6)
MCHC RBC-ENTMCNC: 27.6 PG — SIGNIFICANT CHANGE UP (ref 27–34)
MCHC RBC-ENTMCNC: 33.8 GM/DL — SIGNIFICANT CHANGE UP (ref 32–36)
MCV RBC AUTO: 81.9 FL — SIGNIFICANT CHANGE UP (ref 80–100)
NRBC # BLD: 0 /100 WBCS — SIGNIFICANT CHANGE UP
NRBC # FLD: 0 K/UL — SIGNIFICANT CHANGE UP
PHOSPHATE SERPL-MCNC: 3 MG/DL — SIGNIFICANT CHANGE UP (ref 2.5–4.5)
PLATELET # BLD AUTO: 161 K/UL — SIGNIFICANT CHANGE UP (ref 150–400)
POTASSIUM SERPL-MCNC: 3.8 MMOL/L — SIGNIFICANT CHANGE UP (ref 3.5–5.3)
POTASSIUM SERPL-SCNC: 3.8 MMOL/L — SIGNIFICANT CHANGE UP (ref 3.5–5.3)
RBC # BLD: 5.57 M/UL — SIGNIFICANT CHANGE UP (ref 4.2–5.8)
RBC # FLD: 13.8 % — SIGNIFICANT CHANGE UP (ref 10.3–14.5)
SODIUM SERPL-SCNC: 141 MMOL/L — SIGNIFICANT CHANGE UP (ref 135–145)
WBC # BLD: 6.93 K/UL — SIGNIFICANT CHANGE UP (ref 3.8–10.5)
WBC # FLD AUTO: 6.93 K/UL — SIGNIFICANT CHANGE UP (ref 3.8–10.5)

## 2021-12-03 RX ORDER — CARVEDILOL PHOSPHATE 80 MG/1
1 CAPSULE, EXTENDED RELEASE ORAL
Qty: 0 | Refills: 0 | DISCHARGE
Start: 2021-12-03

## 2021-12-03 RX ORDER — ASPIRIN/CALCIUM CARB/MAGNESIUM 324 MG
1 TABLET ORAL
Qty: 0 | Refills: 0 | DISCHARGE

## 2021-12-03 RX ORDER — ASPIRIN/CALCIUM CARB/MAGNESIUM 324 MG
1 TABLET ORAL
Qty: 30 | Refills: 0
Start: 2021-12-03 | End: 2022-01-01

## 2021-12-03 RX ORDER — LABETALOL HCL 100 MG
1 TABLET ORAL
Qty: 0 | Refills: 0 | DISCHARGE

## 2021-12-03 RX ORDER — CARVEDILOL PHOSPHATE 80 MG/1
25 CAPSULE, EXTENDED RELEASE ORAL EVERY 12 HOURS
Refills: 0 | Status: DISCONTINUED | OUTPATIENT
Start: 2021-12-03 | End: 2021-12-03

## 2021-12-03 RX ORDER — INFLUENZA VIRUS VACCINE 15; 15; 15; 15 UG/.5ML; UG/.5ML; UG/.5ML; UG/.5ML
0.5 SUSPENSION INTRAMUSCULAR ONCE
Refills: 0 | Status: DISCONTINUED | OUTPATIENT
Start: 2021-12-03 | End: 2021-12-03

## 2021-12-03 RX ORDER — ATORVASTATIN CALCIUM 80 MG/1
1 TABLET, FILM COATED ORAL
Qty: 30 | Refills: 0
Start: 2021-12-03 | End: 2022-01-01

## 2021-12-03 RX ORDER — ATORVASTATIN CALCIUM 80 MG/1
1 TABLET, FILM COATED ORAL
Qty: 0 | Refills: 0 | DISCHARGE
Start: 2021-12-03

## 2021-12-03 RX ORDER — LISINOPRIL 2.5 MG/1
1 TABLET ORAL
Qty: 30 | Refills: 0
Start: 2021-12-03 | End: 2022-01-01

## 2021-12-03 RX ORDER — ATORVASTATIN CALCIUM 80 MG/1
1 TABLET, FILM COATED ORAL
Qty: 0 | Refills: 0 | DISCHARGE

## 2021-12-03 RX ORDER — TICAGRELOR 90 MG/1
1 TABLET ORAL
Qty: 180 | Refills: 0
Start: 2021-12-03 | End: 2022-03-02

## 2021-12-03 RX ORDER — CARVEDILOL PHOSPHATE 80 MG/1
12.5 CAPSULE, EXTENDED RELEASE ORAL ONCE
Refills: 0 | Status: COMPLETED | OUTPATIENT
Start: 2021-12-03 | End: 2021-12-03

## 2021-12-03 RX ORDER — CARVEDILOL PHOSPHATE 80 MG/1
1 CAPSULE, EXTENDED RELEASE ORAL
Qty: 60 | Refills: 0
Start: 2021-12-03 | End: 2022-01-01

## 2021-12-03 RX ORDER — LISINOPRIL 2.5 MG/1
1 TABLET ORAL
Qty: 0 | Refills: 0 | DISCHARGE
Start: 2021-12-03

## 2021-12-03 RX ADMIN — LISINOPRIL 40 MILLIGRAM(S): 2.5 TABLET ORAL at 06:24

## 2021-12-03 RX ADMIN — CARVEDILOL PHOSPHATE 12.5 MILLIGRAM(S): 80 CAPSULE, EXTENDED RELEASE ORAL at 12:20

## 2021-12-03 RX ADMIN — TICAGRELOR 90 MILLIGRAM(S): 90 TABLET ORAL at 06:23

## 2021-12-03 RX ADMIN — CARVEDILOL PHOSPHATE 12.5 MILLIGRAM(S): 80 CAPSULE, EXTENDED RELEASE ORAL at 06:24

## 2021-12-03 RX ADMIN — Medication 81 MILLIGRAM(S): at 12:20

## 2021-12-03 RX ADMIN — AMLODIPINE BESYLATE 10 MILLIGRAM(S): 2.5 TABLET ORAL at 06:23

## 2021-12-03 NOTE — PROGRESS NOTE ADULT - SUBJECTIVE AND OBJECTIVE BOX
[facility]   MD ANTHONY HEREDIA PA    TEL:  OFFICE: [phone]  DR BONDS CELL: [phone]  DR. ROSALES CELL: [phone]  AR MURRAY CELL: [phone]    From 5pm-7am Answering Service [phone]    -- RENAL FOLLOW UP NOTE ---Date of Service 12-03-21 @ 15:59    Patient is a 46y old  Male who presents with a chief complaint of Chest pain (03 Dec 2021 07:46)      Patient seen and examined at bedside. No chest pain/sob    VITALS:  T(F): 98.2 (12-03-21 @ 15:32), Max: 98.5 (12-02-21 @ 19:41)  HR: 80 (12-03-21 @ 15:32)  BP: 169/91 (12-03-21 @ 15:32)  RR: 16 (12-03-21 @ 15:32)  SpO2: 99% (12-03-21 @ 15:32)  Wt(kg): --        PHYSICAL EXAM:  Constitutional: NAD  Neck: No JVD  Respiratory: CTAB, no wheezes, rales or rhonchi  Cardiovascular: S1, S2, RRR  Gastrointestinal: BS+, soft, NT/ND  Extremities: No peripheral edema    Hospital Medications:   MEDICATIONS  (STANDING):  amLODIPine   Tablet 10 milliGRAM(s) Oral every 24 hours  aspirin  chewable 81 milliGRAM(s) Oral daily  atorvastatin 80 milliGRAM(s) Oral at bedtime  carvedilol 25 milliGRAM(s) Oral every 12 hours  influenza   Vaccine 0.5 milliLiter(s) IntraMuscular once  lisinopril 40 milliGRAM(s) Oral daily  sodium chloride 0.9%. 1000 milliLiter(s) (100 mL/Hr) IV Continuous <Continuous>  ticagrelor 90 milliGRAM(s) Oral every 12 hours      LABS:  12-03    141  |  106  |  14  ----------------------------<  75  3.8   |  22  |  1.27    Ca    8.9      03 Dec 2021 06:55  Phos  3.0     12-03  Mg     2.10     12-03    TPro  6.3  /  Alb  4.3  /  TBili  0.6  /  DBili      /  AST  25  /  ALT  29  /  AlkPhos  68  12-01    Creatinine Trend: 1.27 <--, 1.32 <--, 1.47 <--, 1.22 <--    Phosphorus Level, Serum: 3.0 mg/dL (12-03 @ 06:55)                              15.4   6.93  )-----------( 161      ( 03 Dec 2021 06:55 )             45.6     Urine Studies:            RADIOLOGY & ADDITIONAL STUDIES:  
  Chele Andrew MD  Interventional Cardiology / Endovascular Specialist  [place] Office : [address] [place] [address]. [phone]  Tel:   [place] Office : [address] [place] [address]  Tel: [phone]  Cell : [phone]    Subjective/Overnight events: Patient lying in bed comfortably. No acute distress.   	  MEDICATIONS:  amLODIPine   Tablet 10 milliGRAM(s) Oral every 24 hours  aspirin  chewable 81 milliGRAM(s) Oral daily  hydrALAZINE 100 milliGRAM(s) Oral every 8 hours  labetalol 300 milliGRAM(s) Oral every 24 hours  lisinopril 40 milliGRAM(s) Oral daily        acetaminophen     Tablet .. 650 milliGRAM(s) Oral every 6 hours PRN      atorvastatin 40 milliGRAM(s) Oral at bedtime    sodium chloride 0.9%. 1000 milliLiter(s) IV Continuous <Continuous>      PAST MEDICAL/SURGICAL HISTORY  PAST MEDICAL & SURGICAL HISTORY:  Hypertension, unspecified type    Hyperlipidemia, unspecified hyperlipidemia type    No significant past surgical history        SOCIAL HISTORY: Substance Use (street drugs): ( x ) never used  (  ) other:    FAMILY HISTORY:  FH: hypertension (Father, Mother)        PHYSICAL EXAM:  T(C): 37 (12-02-21 @ 10:03), Max: 37.1 (12-02-21 @ 04:58)  HR: 91 (12-02-21 @ 10:03) (68 - 101)  BP: 150/89 (12-02-21 @ 10:03) (140/80 - 185/104)  RR: 17 (12-02-21 @ 10:03) (16 - 18)  SpO2: 97% (12-02-21 @ 10:03) (95% - 100%)  Wt(kg): --  I&O's Summary        GENERAL: NAD  EYES: EOMI, PERRLA, conjunctiva and sclera clear  ENMT: No tonsillar erythema, exudates, or enlargement  Cardiovascular: Normal S1 S2, No JVD, No murmurs, No edema  Respiratory: Lungs clear to auscultation	  Gastrointestinal:  Soft, Non-tender, + BS	  Extremities: No edema                                  15.7   5.56  )-----------( 176      ( 02 Dec 2021 07:32 )             49.7     12-02    137  |  103  |  13  ----------------------------<  77  3.4<L>   |  23  |  1.32<H>    Ca    9.1      02 Dec 2021 07:32    TPro  6.3  /  Alb  4.3  /  TBili  0.6  /  DBili  x   /  AST  25  /  ALT  29  /  AlkPhos  68  12-01    proBNP:   Lipid Profile:   HgA1c:   TSH:     Consultant(s) Notes Reviewed:  [x ] YES  [ ] NO    Care Discussed with Consultants/Other Providers [ x] YES  [ ] NO    Imaging Personally Reviewed independently:  [x] YES  [ ] NO    All labs, radiologic studies, vitals, orders and medications list reviewed. Patient is seen and examined at bedside. Case discussed with medical team.

## 2021-12-03 NOTE — DISCHARGE NOTE PROVIDER - NSDCMRMEDTOKEN_GEN_ALL_CORE_FT
amlodipine-benazepril 10 mg-40 mg oral capsule: 1 cap(s) orally once a day  aspirin 81 mg oral tablet, chewable: 1 tab(s) orally once a day  atorvastatin 40 mg oral tablet: 1 tab(s) orally once a day  labetalol 300 mg oral tablet: 1 tab(s) orally once a day  lisinopril 40 mg oral tablet: 1 tab(s) orally once a day   amlodipine-benazepril 10 mg-40 mg oral capsule: 1 cap(s) orally once a day  aspirin 81 mg oral tablet, chewable: 1 tab(s) orally once a day  atorvastatin 40 mg oral tablet: 1 tab(s) orally once a day  labetalol 300 mg oral tablet: 1 tab(s) orally once a day  lisinopril 40 mg oral tablet: 1 tab(s) orally once a day  ticagrelor 90 mg oral tablet: 1 tab(s) orally every 12 hours   amlodipine-benazepril 10 mg-40 mg oral capsule: 1 cap(s) orally once a day  aspirin 81 mg oral tablet, chewable: 1 tab(s) orally once a day  atorvastatin 80 mg oral tablet: 1 tab(s) orally once a day (at bedtime)  carvedilol 25 mg oral tablet: 1 tab(s) orally every 12 hours  labetalol 300 mg oral tablet: 1 tab(s) orally once a day  lisinopril 40 mg oral tablet: 1 tab(s) orally once a day  ticagrelor 90 mg oral tablet: 1 tab(s) orally every 12 hours   amlodipine-benazepril 10 mg-40 mg oral capsule: 1 cap(s) orally once a day  aspirin 81 mg oral tablet, chewable: 1 tab(s) orally once a day  atorvastatin 80 mg oral tablet: 1 tab(s) orally once a day (at bedtime)  carvedilol 25 mg oral tablet: 1 tab(s) orally every 12 hours  lisinopril 40 mg oral tablet: 1 tab(s) orally once a day  ticagrelor 90 mg oral tablet: 1 tab(s) orally every 12 hours

## 2021-12-03 NOTE — DISCHARGE NOTE PROVIDER - NSDCCPCAREPLAN_GEN_ALL_CORE_FT
PRINCIPAL DISCHARGE DIAGNOSIS  Diagnosis: CAD S/P percutaneous coronary angioplasty  Assessment and Plan of Treatment: Continue aspirin and Brilinta, do not stop unless instructed by your physician.  Continue low salt, fat, cholesterol and carbohydrate diet. Follow up with cardiologist and primary care physician's routine appointment.        SECONDARY DISCHARGE DIAGNOSES  Diagnosis: HTN (hypertension)  Assessment and Plan of Treatment: Low sodium and fat diet, continue anti-hypertensive medications, and follow up with primary care physician.       PRINCIPAL DISCHARGE DIAGNOSIS  Diagnosis: CAD S/P percutaneous coronary angioplasty  Assessment and Plan of Treatment: Continue aspirin and Brilinta, do not stop unless instructed by your physician.  Continue low salt, fat, cholesterol and carbohydrate diet. Follow up with cardiologist and primary care physician's routine appointment.   Please Call DR Andrew 's Office for an appointment in 1 week        SECONDARY DISCHARGE DIAGNOSES  Diagnosis: HTN (hypertension)  Assessment and Plan of Treatment: Low sodium and fat diet, continue anti-hypertensive medications, and follow up with primary care physician.

## 2021-12-03 NOTE — DISCHARGE NOTE NURSING/CASE MANAGEMENT/SOCIAL WORK - NSDCPEFALRISK_GEN_ALL_CORE
For information on Fall & Injury Prevention, visit: [email or web address] OR  [email or web address] OR  https://www.cdc.gov/steadi/patient.html

## 2021-12-03 NOTE — PROGRESS NOTE ADULT - ASSESSMENT
45 y/o M with PMH HTN and HLD who presents for left sided chest pain. Patient was admitted to the CDU for a stress test which was positive. s/p cath and PCI 12/2. nephrology consulted for elevated scr and htn    elevated scr  relatively better  no hx of ckd per patient  hx of uncontrolled HTN for >5 years. multiple ed visit for uncontrolled htn per patient  check UA, urine cr, na  continue ACEI for now  s/p cath. monitor for CELESTE  avoid nephrotoxic agents    HTN  uncontrolled htn for many years, multiple visit to ED for htn  given age, hypokalemia. will r/o secondary cause  check renin, aldosterone, metanephrine, renal duplex   pt advised to f/u with dr andujar in 1-2 wks   currently acceptable bp  on norvasc 10,  lisinopril 40 daily  coreg increased to 25 today  continue and monitor     hypokalemia  supplement  monitor

## 2021-12-03 NOTE — DISCHARGE NOTE NURSING/CASE MANAGEMENT/SOCIAL WORK - PATIENT PORTAL LINK FT
You can access the FollowMyHealth Patient Portal offered by [facility] by registering at the following website: [email or web address]. By joining [employer]’s FollowMyHealth portal, you will also be able to view your health information using other applications (apps) compatible with our system.

## 2021-12-03 NOTE — DISCHARGE NOTE PROVIDER - HOSPITAL COURSE
46 year old male that has a history of hypertension and hyperlipidemia came to the ED because of chest pain  Abnormal stress test.   Echo- EF 58%. Mild concentric left ventricular hypertrophy. Normal left ventricular systolic function. No segmental wall motion abnormalities. Mild diastolic dysfunction (Stage I).  Stress test- abnormal. Myocardial Perfusion SPECT results are abnormal. There are small, mild defects in inferior, inferoseptal walls that are predominantly reversible, suggestive of mild ischemia. Post-stress gated wall motion analysis was performed (LVEF = 42 %;LVEDV = 128 ml.), revealing hypokinesis in inferior, inferoseptal wall(s). The LV time activity curve suggests diastolic dysfunction. The RV function appear normal.  -Pt with an abnormal stress test with evidence of ischemia. Admitted for cardiac catheterization tomorrow.     12/2 CATH: LVEDP 12, resolute kassy stent OM 80; RRA access  ASA/Brilinta      HTN  SBP in 200s likely due to not receiving home blood pressure medications since prior to admission.   - Restart home meds   - Hydralazine 100mg TID   - Labetalol 300mg qd --> switch to Coreg  - Amlodipine/benzapril --> norvasc 10mg and lisinopril 40mg while inpatient    BENJAMIN  - Seen bu Nephrology: Monitor Creat Closely    46 year old male that has a history of hypertension and hyperlipidemia came to the ED because of chest pain  Abnormal stress test.   Echo- EF 58%. Mild concentric left ventricular hypertrophy. Normal left ventricular systolic function. No segmental wall motion abnormalities. Mild diastolic dysfunction (Stage I).  Stress test- abnormal. Myocardial Perfusion SPECT results are abnormal. There are small, mild defects in inferior, inferoseptal walls that are predominantly reversible, suggestive of mild ischemia. Post-stress gated wall motion analysis was performed (LVEF = 42 %;LVEDV = 128 ml.), revealing hypokinesis in inferior, inferoseptal wall(s). The LV time activity curve suggests diastolic dysfunction. The RV function appear normal.  -Pt with an abnormal stress test with evidence of ischemia. Admitted for cardiac catheterization tomorrow.     12/2 CATH: LVEDP 12, resolute kassy stent OM 80; RRA access  ASA/Brilinta ( Coverage Confirmed with pts' pharmacy      HTN  SBP in 200s likely due to not receiving home blood pressure medications since prior to admission.   - Meds adjusted      Case  discussed with attending, Pt is stable for Discharge Home

## 2021-12-03 NOTE — DISCHARGE NOTE PROVIDER - CARE PROVIDER_API CALL
Chele Andrew (MD)  Cardiovascular Disease; Internal Medicine  [address] [place] [address]  Phone: [phone]  Fax: [phone]  Follow Up Time:

## 2021-12-03 NOTE — PATIENT PROFILE ADULT - FALL HARM RISK - UNIVERSAL INTERVENTIONS
Bed in lowest position, wheels locked, appropriate side rails in place/Call bell, personal items and telephone in reach/Instruct patient to call for assistance before getting out of bed or chair/Non-slip footwear when patient is out of bed/[place] to call system/Physically safe environment - no spills, clutter or unnecessary equipment/Purposeful Proactive Rounding/Room/bathroom lighting operational, light cord in reach

## 2021-12-03 NOTE — CHART NOTE - NSCHARTNOTEFT_GEN_A_CORE
46y Male s/p cardiac cath for right radial site check. Pt without any complaints at this time.    Right radial site: Dressing in place c/d/i. No hematoma. No swelling/edema/discoloration/coldness of extremity. Pulses and sensation intact. Cap refill <3 secs.     Will cont to monitor.

## 2021-12-06 DIAGNOSIS — Z71.89 OTHER SPECIFIED COUNSELING: ICD-10-CM

## 2021-12-06 LAB — ALDOST SERPL-MCNC: 42.8 NG/DL — HIGH

## 2021-12-08 LAB
METANEPHRINE, PL: 49.7 PG/ML — SIGNIFICANT CHANGE UP (ref 0–88)
NORMETANEPHRINE, PL: 128.3 PG/ML — SIGNIFICANT CHANGE UP (ref 0–218.9)

## 2021-12-14 LAB — RENIN PLAS-CCNC: 0.31 NG/ML/HR — SIGNIFICANT CHANGE UP (ref 0.17–5.38)

## 2022-07-10 ENCOUNTER — EMERGENCY (EMERGENCY)
Facility: HOSPITAL | Age: 47
LOS: 1 days | Discharge: HOME | End: 2022-07-10
Attending: STUDENT IN AN ORGANIZED HEALTH CARE EDUCATION/TRAINING PROGRAM | Admitting: STUDENT IN AN ORGANIZED HEALTH CARE EDUCATION/TRAINING PROGRAM

## 2022-07-10 VITALS
DIASTOLIC BLOOD PRESSURE: 84 MMHG | TEMPERATURE: 98 F | HEART RATE: 88 BPM | RESPIRATION RATE: 16 BRPM | OXYGEN SATURATION: 99 % | SYSTOLIC BLOOD PRESSURE: 151 MMHG

## 2022-07-10 VITALS
DIASTOLIC BLOOD PRESSURE: 78 MMHG | OXYGEN SATURATION: 100 % | SYSTOLIC BLOOD PRESSURE: 155 MMHG | RESPIRATION RATE: 16 BRPM | TEMPERATURE: 97 F | HEART RATE: 64 BPM

## 2022-07-10 LAB
ALBUMIN SERPL ELPH-MCNC: 4.4 G/DL — SIGNIFICANT CHANGE UP (ref 3.3–5)
ALP SERPL-CCNC: 84 U/L — SIGNIFICANT CHANGE UP (ref 40–120)
ALT FLD-CCNC: 32 U/L — SIGNIFICANT CHANGE UP (ref 4–41)
ANION GAP SERPL CALC-SCNC: 15 MMOL/L — HIGH (ref 7–14)
AST SERPL-CCNC: 29 U/L — SIGNIFICANT CHANGE UP (ref 4–40)
B PERT DNA SPEC QL NAA+PROBE: SIGNIFICANT CHANGE UP
B PERT+PARAPERT DNA PNL SPEC NAA+PROBE: SIGNIFICANT CHANGE UP
BASOPHILS # BLD AUTO: 0.08 K/UL — SIGNIFICANT CHANGE UP (ref 0–0.2)
BASOPHILS NFR BLD AUTO: 1.1 % — SIGNIFICANT CHANGE UP (ref 0–2)
BILIRUB SERPL-MCNC: 0.7 MG/DL — SIGNIFICANT CHANGE UP (ref 0.2–1.2)
BORDETELLA PARAPERTUSSIS (RAPRVP): SIGNIFICANT CHANGE UP
BUN SERPL-MCNC: 14 MG/DL — SIGNIFICANT CHANGE UP (ref 7–23)
C PNEUM DNA SPEC QL NAA+PROBE: SIGNIFICANT CHANGE UP
CALCIUM SERPL-MCNC: 9.2 MG/DL — SIGNIFICANT CHANGE UP (ref 8.4–10.5)
CHLORIDE SERPL-SCNC: 103 MMOL/L — SIGNIFICANT CHANGE UP (ref 98–107)
CO2 SERPL-SCNC: 22 MMOL/L — SIGNIFICANT CHANGE UP (ref 22–31)
CREAT SERPL-MCNC: 1.14 MG/DL — SIGNIFICANT CHANGE UP (ref 0.5–1.3)
EGFR: 80 ML/MIN/1.73M2 — SIGNIFICANT CHANGE UP
EOSINOPHIL # BLD AUTO: 0.28 K/UL — SIGNIFICANT CHANGE UP (ref 0–0.5)
EOSINOPHIL NFR BLD AUTO: 3.7 % — SIGNIFICANT CHANGE UP (ref 0–6)
FLUAV SUBTYP SPEC NAA+PROBE: SIGNIFICANT CHANGE UP
FLUBV RNA SPEC QL NAA+PROBE: SIGNIFICANT CHANGE UP
GLUCOSE SERPL-MCNC: 102 MG/DL — HIGH (ref 70–99)
HADV DNA SPEC QL NAA+PROBE: SIGNIFICANT CHANGE UP
HCOV 229E RNA SPEC QL NAA+PROBE: SIGNIFICANT CHANGE UP
HCOV HKU1 RNA SPEC QL NAA+PROBE: SIGNIFICANT CHANGE UP
HCOV NL63 RNA SPEC QL NAA+PROBE: SIGNIFICANT CHANGE UP
HCOV OC43 RNA SPEC QL NAA+PROBE: SIGNIFICANT CHANGE UP
HCT VFR BLD CALC: 50.8 % — HIGH (ref 39–50)
HGB BLD-MCNC: 16.5 G/DL — SIGNIFICANT CHANGE UP (ref 13–17)
HMPV RNA SPEC QL NAA+PROBE: SIGNIFICANT CHANGE UP
HPIV1 RNA SPEC QL NAA+PROBE: SIGNIFICANT CHANGE UP
HPIV2 RNA SPEC QL NAA+PROBE: SIGNIFICANT CHANGE UP
HPIV3 RNA SPEC QL NAA+PROBE: SIGNIFICANT CHANGE UP
HPIV4 RNA SPEC QL NAA+PROBE: SIGNIFICANT CHANGE UP
IANC: 4.86 K/UL — SIGNIFICANT CHANGE UP (ref 1.8–7.4)
IMM GRANULOCYTES NFR BLD AUTO: 0.3 % — SIGNIFICANT CHANGE UP (ref 0–1.5)
LYMPHOCYTES # BLD AUTO: 1.58 K/UL — SIGNIFICANT CHANGE UP (ref 1–3.3)
LYMPHOCYTES # BLD AUTO: 21 % — SIGNIFICANT CHANGE UP (ref 13–44)
M PNEUMO DNA SPEC QL NAA+PROBE: SIGNIFICANT CHANGE UP
MCHC RBC-ENTMCNC: 27 PG — SIGNIFICANT CHANGE UP (ref 27–34)
MCHC RBC-ENTMCNC: 32.5 GM/DL — SIGNIFICANT CHANGE UP (ref 32–36)
MCV RBC AUTO: 83 FL — SIGNIFICANT CHANGE UP (ref 80–100)
MONOCYTES # BLD AUTO: 0.72 K/UL — SIGNIFICANT CHANGE UP (ref 0–0.9)
MONOCYTES NFR BLD AUTO: 9.5 % — SIGNIFICANT CHANGE UP (ref 2–14)
NEUTROPHILS # BLD AUTO: 4.86 K/UL — SIGNIFICANT CHANGE UP (ref 1.8–7.4)
NEUTROPHILS NFR BLD AUTO: 64.4 % — SIGNIFICANT CHANGE UP (ref 43–77)
NRBC # BLD: 0 /100 WBCS — SIGNIFICANT CHANGE UP
NRBC # FLD: 0 K/UL — SIGNIFICANT CHANGE UP
PLATELET # BLD AUTO: 208 K/UL — SIGNIFICANT CHANGE UP (ref 150–400)
POTASSIUM SERPL-MCNC: 3.3 MMOL/L — LOW (ref 3.5–5.3)
POTASSIUM SERPL-SCNC: 3.3 MMOL/L — LOW (ref 3.5–5.3)
PROT SERPL-MCNC: 7.4 G/DL — SIGNIFICANT CHANGE UP (ref 6–8.3)
RAPID RVP RESULT: SIGNIFICANT CHANGE UP
RBC # BLD: 6.12 M/UL — HIGH (ref 4.2–5.8)
RBC # FLD: 13.1 % — SIGNIFICANT CHANGE UP (ref 10.3–14.5)
RSV RNA SPEC QL NAA+PROBE: SIGNIFICANT CHANGE UP
RV+EV RNA SPEC QL NAA+PROBE: SIGNIFICANT CHANGE UP
SARS-COV-2 RNA SPEC QL NAA+PROBE: SIGNIFICANT CHANGE UP
SODIUM SERPL-SCNC: 140 MMOL/L — SIGNIFICANT CHANGE UP (ref 135–145)
WBC # BLD: 7.54 K/UL — SIGNIFICANT CHANGE UP (ref 3.8–10.5)
WBC # FLD AUTO: 7.54 K/UL — SIGNIFICANT CHANGE UP (ref 3.8–10.5)

## 2022-07-10 PROCEDURE — 99285 EMERGENCY DEPT VISIT HI MDM: CPT | Mod: 25

## 2022-07-10 PROCEDURE — 70450 CT HEAD/BRAIN W/O DYE: CPT | Mod: 26,MA

## 2022-07-10 PROCEDURE — 93010 ELECTROCARDIOGRAM REPORT: CPT

## 2022-07-10 RX ORDER — METOCLOPRAMIDE HCL 10 MG
10 TABLET ORAL ONCE
Refills: 0 | Status: COMPLETED | OUTPATIENT
Start: 2022-07-10 | End: 2022-07-10

## 2022-07-10 RX ORDER — KETOROLAC TROMETHAMINE 30 MG/ML
15 SYRINGE (ML) INJECTION ONCE
Refills: 0 | Status: DISCONTINUED | OUTPATIENT
Start: 2022-07-10 | End: 2022-07-10

## 2022-07-10 RX ADMIN — Medication 10 MILLIGRAM(S): at 19:37

## 2022-07-10 RX ADMIN — Medication 15 MILLIGRAM(S): at 19:37

## 2022-07-10 NOTE — ED ADULT NURSE NOTE - OBJECTIVE STATEMENT
Pt presents to room 1b, alert&orientedx4, ambulatory, PMHX HTN, HLD, [id number] stent coming to ED for headache since this morning. Denies any change of vision, dizziness, n/v/d chest pain or SOB. NAD noted, breathing non-labored is non-diaphoretic. 20g IV established on left ac, labs drawn and sent, medications given as ordered. Pending CT

## 2022-07-10 NOTE — ED ADULT TRIAGE NOTE - CHIEF COMPLAINT QUOTE
head pressure since this am. denies  n,v fever,cough,injury. pmh- htn,cardiac stent,takes brilenta  Frisian speaking

## 2022-07-10 NOTE — ED PROVIDER NOTE - EKG ADDITIONAL INFORMATION FREE TEXT
T wave inversions, the same T wave findings in the past ekg, NSR, sinus rhythm without life-threatening arrhythmic patter such as short or long LA interval, ipsilon wave form, or Brugada pattern.

## 2022-07-10 NOTE — ED PROVIDER NOTE - NSICDXPASTMEDICALHX_GEN_ALL_CORE_FT
PAST MEDICAL HISTORY:  CAD (coronary artery disease) s/p stent to OM 12/21    Hyperlipidemia, unspecified hyperlipidemia type     Hypertension, unspecified type

## 2022-07-10 NOTE — ED PROVIDER NOTE - NSPTACCESSSVCSAPPTDETAILS_ED_ALL_ED_FT
headache, tension/migraine, for MRI head to rule out brain mass, and continue care for chronic headache

## 2022-07-10 NOTE — ED PROVIDER NOTE - PHYSICAL EXAMINATION
Vital signs reviewed.  CONSTITUTIONAL: NAD, awake  HEAD: Normocephalic; atraumatic  EYES: EOMI, no conjunctival injection, no scleral icterus  MOUTH/THROAT:  MMM  NECK: Trachea midline  CV: Normal S1, S2; no audible murmurs; extremities WWP  RESP: normal work of breathing; CTAB, no stridor  ABD: soft, non-distended; non-tender  : Deferred  MSK/EXT: no edema, no limited ROM  SKIN: No rashes on exposed skin surfaces  NEURO: Moves all extremities spontaneously with no focal deficits, speech is appropriate

## 2022-07-10 NOTE — ED PROVIDER NOTE - PATIENT PORTAL LINK FT
You can access the FollowMyHealth Patient Portal offered by [facility] by registering at the following website: [email or web address]. By joining [employer]’s FollowMyHealth portal, you will also be able to view your health information using other applications (apps) compatible with our system.

## 2022-07-10 NOTE — ED ADULT NURSE NOTE - NS ED NURSE LEVEL OF CONSCIOUSNESS AFFECT
Left voicemail notifying pt office closes at [address] pt in vm to go to [facility] if have not done so already. Also advised pt to call office with further questions. Calm/Appropriate

## 2022-07-10 NOTE — ED PROVIDER NOTE - CLINICAL SUMMARY MEDICAL DECISION MAKING FREE TEXT BOX
47M pmh HLD, HTN, CAD s/p stent on brilinta who presents with 1d headache upon awakening.  No n/v/vision changes, photophobia/phonophobia.  No hx migraines or headaches. The differential diagnosis includes but is not limited to tension headache, migraine, ICH, viral illness.

## 2022-07-10 NOTE — ED PROVIDER NOTE - ATTENDING CONTRIBUTION TO CARE
I have personally seen and examined this patient.  I have fully participated in the care of this patient. I performed a substantive portion of the visit including all aspects of the medical decision making. I have reviewed all pertinent clinical information, including history, physical exam, plan and the Resident’s note and agree except as noted. - MD Fara.    48 yo M, h/o CAD, s/p stents, on ASA and Brilianta, HTN, on multiple meds, p/w headache, general, broad, bilateral, not likely menigitic or intracranial bleed, nerulogically intact, but given his vascular risks and antiplatelet use, will get ct head to r/o major bleed or mass effect, likely HTN related headache, pt has not missed meds, having the appointment next week, suggesting to discuss with his doctor to adjust the medication dose, T wave in version on ekg, but the pt had the same morphological change in the past, denised chest pain or tightness, no cardiac HPI at this ED visit.

## 2022-07-10 NOTE — ED ADULT NURSE NOTE - CHIEF COMPLAINT QUOTE
head pressure since this am. denies  n,v fever,cough,injury. pmh- htn,cardiac stent,takes brilenta  Malay speaking

## 2022-07-10 NOTE — ED PROVIDER NOTE - OBJECTIVE STATEMENT
47M pmh HLD, HTN, CAD s/p stent x1 OM (12/21) who presents with head pressure.  Described as pressure, comes in waves, 5/10 severe in middle of top of head that began when he awoke this morning and has been fairly constant since that time; he took tylenol with minimal relief.  Non-positional, no photophobia/phonophobia.  He denies getting previous similar headaches or history of migraines.  Denies n/v, cough, URI sx, fevers, trauma, vision changes, gait abnormalities, weakness or numbness.  No CP or SOB, abd pain. 47M pmh HLD, HTN, CAD s/p stent x1 OM (12/21) who presents with head pressure.  Described as pressure, comes in waves, 5/10 severe in middle of top of head that began when he awoke this morning and has been fairly constant since that time; he took tylenol with minimal relief.  Non-positional, no photophobia/phonophobia.  He denies getting previous similar headaches or history of migraines.  Denies n/v, cough, URI sx, fevers, trauma, vision changes, gait abnormalities, weakness or numbness.  No CP or SOB, abd pain.     [id number] utilized.

## 2022-08-16 ENCOUNTER — EMERGENCY (EMERGENCY)
Facility: HOSPITAL | Age: 47
LOS: 1 days | Discharge: HOME | End: 2022-08-16
Attending: EMERGENCY MEDICINE | Admitting: EMERGENCY MEDICINE

## 2022-08-16 VITALS
DIASTOLIC BLOOD PRESSURE: 75 MMHG | OXYGEN SATURATION: 98 % | SYSTOLIC BLOOD PRESSURE: 153 MMHG | TEMPERATURE: 98 F | HEART RATE: 80 BPM | RESPIRATION RATE: 18 BRPM

## 2022-08-16 PROBLEM — I25.10 ATHEROSCLEROTIC HEART DISEASE OF NATIVE CORONARY ARTERY WITHOUT ANGINA PECTORIS: Chronic | Status: ACTIVE | Noted: 2022-07-10

## 2022-08-16 LAB
ALBUMIN SERPL ELPH-MCNC: 4.1 G/DL — SIGNIFICANT CHANGE UP (ref 3.3–5)
ALP SERPL-CCNC: 91 U/L — SIGNIFICANT CHANGE UP (ref 40–120)
ALT FLD-CCNC: 39 U/L — SIGNIFICANT CHANGE UP (ref 4–41)
ANION GAP SERPL CALC-SCNC: 11 MMOL/L — SIGNIFICANT CHANGE UP (ref 7–14)
AST SERPL-CCNC: 27 U/L — SIGNIFICANT CHANGE UP (ref 4–40)
BASOPHILS # BLD AUTO: 0.06 K/UL — SIGNIFICANT CHANGE UP (ref 0–0.2)
BASOPHILS NFR BLD AUTO: 0.8 % — SIGNIFICANT CHANGE UP (ref 0–2)
BILIRUB SERPL-MCNC: 0.6 MG/DL — SIGNIFICANT CHANGE UP (ref 0.2–1.2)
BUN SERPL-MCNC: 18 MG/DL — SIGNIFICANT CHANGE UP (ref 7–23)
CALCIUM SERPL-MCNC: 9.1 MG/DL — SIGNIFICANT CHANGE UP (ref 8.4–10.5)
CHLORIDE SERPL-SCNC: 106 MMOL/L — SIGNIFICANT CHANGE UP (ref 98–107)
CO2 SERPL-SCNC: 27 MMOL/L — SIGNIFICANT CHANGE UP (ref 22–31)
CREAT SERPL-MCNC: 1.44 MG/DL — HIGH (ref 0.5–1.3)
EGFR: 61 ML/MIN/1.73M2 — SIGNIFICANT CHANGE UP
EOSINOPHIL # BLD AUTO: 0.18 K/UL — SIGNIFICANT CHANGE UP (ref 0–0.5)
EOSINOPHIL NFR BLD AUTO: 2.3 % — SIGNIFICANT CHANGE UP (ref 0–6)
GLUCOSE SERPL-MCNC: 126 MG/DL — HIGH (ref 70–99)
HCT VFR BLD CALC: 45.5 % — SIGNIFICANT CHANGE UP (ref 39–50)
HGB BLD-MCNC: 14.9 G/DL — SIGNIFICANT CHANGE UP (ref 13–17)
IANC: 5.3 K/UL — SIGNIFICANT CHANGE UP (ref 1.8–7.4)
IMM GRANULOCYTES NFR BLD AUTO: 0.5 % — SIGNIFICANT CHANGE UP (ref 0–1.5)
LIDOCAIN IGE QN: 18 U/L — SIGNIFICANT CHANGE UP (ref 7–60)
LYMPHOCYTES # BLD AUTO: 1.49 K/UL — SIGNIFICANT CHANGE UP (ref 1–3.3)
LYMPHOCYTES # BLD AUTO: 19.1 % — SIGNIFICANT CHANGE UP (ref 13–44)
MCHC RBC-ENTMCNC: 27.1 PG — SIGNIFICANT CHANGE UP (ref 27–34)
MCHC RBC-ENTMCNC: 32.7 GM/DL — SIGNIFICANT CHANGE UP (ref 32–36)
MCV RBC AUTO: 82.7 FL — SIGNIFICANT CHANGE UP (ref 80–100)
MONOCYTES # BLD AUTO: 0.72 K/UL — SIGNIFICANT CHANGE UP (ref 0–0.9)
MONOCYTES NFR BLD AUTO: 9.2 % — SIGNIFICANT CHANGE UP (ref 2–14)
NEUTROPHILS # BLD AUTO: 5.3 K/UL — SIGNIFICANT CHANGE UP (ref 1.8–7.4)
NEUTROPHILS NFR BLD AUTO: 68.1 % — SIGNIFICANT CHANGE UP (ref 43–77)
NRBC # BLD: 0 /100 WBCS — SIGNIFICANT CHANGE UP (ref 0–0)
NRBC # FLD: 0 K/UL — SIGNIFICANT CHANGE UP (ref 0–0)
PLATELET # BLD AUTO: 188 K/UL — SIGNIFICANT CHANGE UP (ref 150–400)
POTASSIUM SERPL-MCNC: 3.2 MMOL/L — LOW (ref 3.5–5.3)
POTASSIUM SERPL-SCNC: 3.2 MMOL/L — LOW (ref 3.5–5.3)
PROT SERPL-MCNC: 6.7 G/DL — SIGNIFICANT CHANGE UP (ref 6–8.3)
RBC # BLD: 5.5 M/UL — SIGNIFICANT CHANGE UP (ref 4.2–5.8)
RBC # FLD: 13.4 % — SIGNIFICANT CHANGE UP (ref 10.3–14.5)
SODIUM SERPL-SCNC: 144 MMOL/L — SIGNIFICANT CHANGE UP (ref 135–145)
WBC # BLD: 7.79 K/UL — SIGNIFICANT CHANGE UP (ref 3.8–10.5)
WBC # FLD AUTO: 7.79 K/UL — SIGNIFICANT CHANGE UP (ref 3.8–10.5)

## 2022-08-16 PROCEDURE — 99284 EMERGENCY DEPT VISIT MOD MDM: CPT

## 2022-08-16 NOTE — ED PROVIDER NOTE - NSFOLLOWUPINSTRUCTIONS_ED_ALL_ED_FT
Advance activity as tolerated.  Continue all previously prescribed medications as directed unless otherwise instructed.  Follow up with your primary care physician in 48-72 hours- bring copies of your results.  Return to the ER for worsening or persistent symptoms, and/or ANY NEW OR CONCERNING SYMPTOMS. If you have issues obtaining follow up, please call: [phone] to obtain a doctor or specialist who takes your insurance in your area.  You may call [phone] to make an appointment with the internal medicine clinic.

## 2022-08-16 NOTE — ED ADULT NURSE NOTE - OBJECTIVE STATEMENT
Receive pt in intake , alert and oriented x 4 c/o RLQ pain.  Denies N/V/D, chills, N/V/D, chest pain, sob.    Resp even and unlabored.  IV 20G lac PLACED. Labs sent

## 2022-08-16 NOTE — ED PROVIDER NOTE - PATIENT PORTAL LINK FT
You can access the FollowMyHealth Patient Portal offered by [facility] by registering at the following website: [email or web address]. By joining [email or web address]’s [email or web address] portal, you will also be able to view your health information using other applications (apps) compatible with our system.

## 2022-08-16 NOTE — ED PROVIDER NOTE - PROGRESS NOTE DETAILS
1cm x 1cm hematoma s/p lifting water bottle. advised to return to ER if hematoma gets larger, and/or worsening pain. labs reviewed and unremarkable. pt is to f/u with PCP.

## 2022-08-16 NOTE — ED PROVIDER NOTE - PHYSICAL EXAMINATION
CONSTITUTIONAL: Well-appearing; well-nourished; in no apparent distress. Non-toxic appearing.   NEURO: Alert & oriented. Gait steady without assistance. Sensory and motor functions are grossly intact.  PSYCH: Mood appropriate. Thought processes intact.   NECK: Supple  CARD: Regular rate and rhythm, no murmurs  RESP: No accessory muscle use; breath sounds clear and equal bilaterally; no wheezes, rhonchi, or rales     ABD: 1cm x 1cm hematoma on right mid abdomen. mild tender to palpation. Soft; no rebound. no tenderness elsewhere on the abdomen.  MUSCULOSKELETAL/EXTREMITIES: FROM in all four extremities; no extremity edema.  SKIN: Warm; dry; no apparent lesions or exudate

## 2022-08-16 NOTE — ED PROVIDER NOTE - OBJECTIVE STATEMENT
48 yo M with HTN, HLD, CAD with 1 stent on Brilanta, c/o ecchymosis on right abd and pain to palpation since yesterday. pt remembers that he was lifting water bottle yesterday. denies direct trauma. denies NVD, fever, dysuria. able to eat and drink as usual. denies ETOH history.

## 2022-08-16 NOTE — ED PROVIDER NOTE - CLINICAL SUMMARY MEDICAL DECISION MAKING FREE TEXT BOX
46 yo M here for ecchymosis on right abd from yesterday which is tender to palpation. no direct trauma that pt can remember. exam found abd has a 1x1 hematoma, but overall is soft non tender no rebound.   possible pain d/t hematoma s/p trauma. no concern for intra-ab organ damage or internal bleeding.   labs to r/o electrolyte imbalance, anemia,

## 2022-08-16 NOTE — ED PROVIDER NOTE - NS ED ATTENDING STATEMENT MOD
This was a shared visit with the MELONY. I reviewed and verified the documentation and independently performed the documented:

## 2022-08-16 NOTE — ED PROVIDER NOTE - NS ED ROS FT
ROS:  GENERAL: No fever, no chills  HEENT: no vision changes, no trouble swallowing, no trouble speaking  CARDIAC: no chest pain  PULMONARY: no cough, no shortness of breath  GI: see HPI  : No dysuria, no frequency, no change in appearance or odor of urine  SKIN: no rashes  NEURO: no headache, no weakness, no dizziness  MSK: No joint pain  All other systems reviewed as negative. As per HPI

## 2022-08-16 NOTE — ED PROVIDER NOTE - ATTENDING APP SHARED VISIT CONTRIBUTION OF CARE
agree with PA note    "46 yo M with HTN, HLD, CAD with 1 stent on Brilanta, c/o ecchymosis on right abd and pain to palpation since yesterday. pt remembers that he was lifting water bottle yesterday. denies direct trauma. denies NVD, fever, dysuria. able to eat and drink as usual. denies ETOH history."    unclear to pt if water bottles hit his abdomen    PE: well appearing; VSS; CTAB/L; s1 s2 no m/r/g abd: small 1X1 area of firmness right abdomen ext: no edema    Imp: abdomen not peritoneal; exam non focal other than 1X1 area of firmness to right abdomen; likely water bottles hit abdomen and small hematoma; will check H/H; pt hemodynamically stable with no other complaints

## 2022-08-16 NOTE — ED ADULT TRIAGE NOTE - CHIEF COMPLAINT QUOTE
p/t c/o of diffuse abd pain, denies any nausea or vomiting, no dysuria, bruising to rt sided abd noted

## 2023-06-05 ENCOUNTER — APPOINTMENT (OUTPATIENT)
Dept: OPHTHALMOLOGY | Facility: CLINIC | Age: 48
End: 2023-06-05

## 2024-01-24 ENCOUNTER — EMERGENCY (EMERGENCY)
Facility: HOSPITAL | Age: 49
LOS: 1 days | Discharge: HOME | End: 2024-01-24
Attending: EMERGENCY MEDICINE | Admitting: EMERGENCY MEDICINE
Payer: MEDICAID

## 2024-01-24 VITALS
OXYGEN SATURATION: 99 % | SYSTOLIC BLOOD PRESSURE: 189 MMHG | DIASTOLIC BLOOD PRESSURE: 109 MMHG | RESPIRATION RATE: 18 BRPM | TEMPERATURE: 98 F | HEART RATE: 84 BPM

## 2024-01-24 VITALS
DIASTOLIC BLOOD PRESSURE: 108 MMHG | TEMPERATURE: 98 F | SYSTOLIC BLOOD PRESSURE: 198 MMHG | RESPIRATION RATE: 16 BRPM | HEART RATE: 94 BPM | OXYGEN SATURATION: 97 %

## 2024-01-24 LAB
ALBUMIN SERPL ELPH-MCNC: 3.9 G/DL — SIGNIFICANT CHANGE UP (ref 3.3–5)
ALP SERPL-CCNC: 85 U/L — SIGNIFICANT CHANGE UP (ref 40–120)
ALT FLD-CCNC: 48 U/L — HIGH (ref 4–41)
ANION GAP SERPL CALC-SCNC: 13 MMOL/L — SIGNIFICANT CHANGE UP (ref 7–14)
AST SERPL-CCNC: 47 U/L — HIGH (ref 4–40)
BASOPHILS # BLD AUTO: 0.07 K/UL — SIGNIFICANT CHANGE UP (ref 0–0.2)
BASOPHILS NFR BLD AUTO: 0.7 % — SIGNIFICANT CHANGE UP (ref 0–2)
BILIRUB SERPL-MCNC: 0.5 MG/DL — SIGNIFICANT CHANGE UP (ref 0.2–1.2)
BUN SERPL-MCNC: 14 MG/DL — SIGNIFICANT CHANGE UP (ref 7–23)
CALCIUM SERPL-MCNC: 9.4 MG/DL — SIGNIFICANT CHANGE UP (ref 8.4–10.5)
CHLORIDE SERPL-SCNC: 99 MMOL/L — SIGNIFICANT CHANGE UP (ref 98–107)
CO2 SERPL-SCNC: 26 MMOL/L — SIGNIFICANT CHANGE UP (ref 22–31)
CREAT SERPL-MCNC: 1.32 MG/DL — HIGH (ref 0.5–1.3)
EGFR: 67 ML/MIN/1.73M2 — SIGNIFICANT CHANGE UP
EOSINOPHIL # BLD AUTO: 0.22 K/UL — SIGNIFICANT CHANGE UP (ref 0–0.5)
EOSINOPHIL NFR BLD AUTO: 2.1 % — SIGNIFICANT CHANGE UP (ref 0–6)
GLUCOSE SERPL-MCNC: 94 MG/DL — SIGNIFICANT CHANGE UP (ref 70–99)
HCT VFR BLD CALC: 51 % — HIGH (ref 39–50)
HGB BLD-MCNC: 16.7 G/DL — SIGNIFICANT CHANGE UP (ref 13–17)
IANC: 7.1 K/UL — SIGNIFICANT CHANGE UP (ref 1.8–7.4)
IMM GRANULOCYTES NFR BLD AUTO: 0.4 % — SIGNIFICANT CHANGE UP (ref 0–0.9)
LYMPHOCYTES # BLD AUTO: 2.09 K/UL — SIGNIFICANT CHANGE UP (ref 1–3.3)
LYMPHOCYTES # BLD AUTO: 20.3 % — SIGNIFICANT CHANGE UP (ref 13–44)
MCHC RBC-ENTMCNC: 26.1 PG — LOW (ref 27–34)
MCHC RBC-ENTMCNC: 32.7 GM/DL — SIGNIFICANT CHANGE UP (ref 32–36)
MCV RBC AUTO: 79.6 FL — LOW (ref 80–100)
MONOCYTES # BLD AUTO: 0.76 K/UL — SIGNIFICANT CHANGE UP (ref 0–0.9)
MONOCYTES NFR BLD AUTO: 7.4 % — SIGNIFICANT CHANGE UP (ref 2–14)
NEUTROPHILS # BLD AUTO: 7.1 K/UL — SIGNIFICANT CHANGE UP (ref 1.8–7.4)
NEUTROPHILS NFR BLD AUTO: 69.1 % — SIGNIFICANT CHANGE UP (ref 43–77)
NRBC # BLD: 0 /100 WBCS — SIGNIFICANT CHANGE UP (ref 0–0)
NRBC # FLD: 0 K/UL — SIGNIFICANT CHANGE UP (ref 0–0)
PLATELET # BLD AUTO: 220 K/UL — SIGNIFICANT CHANGE UP (ref 150–400)
POTASSIUM SERPL-MCNC: 3 MMOL/L — LOW (ref 3.5–5.3)
POTASSIUM SERPL-SCNC: 3 MMOL/L — LOW (ref 3.5–5.3)
PROT SERPL-MCNC: 7.3 G/DL — SIGNIFICANT CHANGE UP (ref 6–8.3)
RBC # BLD: 6.41 M/UL — HIGH (ref 4.2–5.8)
RBC # FLD: 13.7 % — SIGNIFICANT CHANGE UP (ref 10.3–14.5)
SODIUM SERPL-SCNC: 138 MMOL/L — SIGNIFICANT CHANGE UP (ref 135–145)
WBC # BLD: 10.28 K/UL — SIGNIFICANT CHANGE UP (ref 3.8–10.5)
WBC # FLD AUTO: 10.28 K/UL — SIGNIFICANT CHANGE UP (ref 3.8–10.5)

## 2024-01-24 PROCEDURE — 93010 ELECTROCARDIOGRAM REPORT: CPT

## 2024-01-24 PROCEDURE — 99284 EMERGENCY DEPT VISIT MOD MDM: CPT

## 2024-01-24 RX ORDER — LABETALOL HCL 100 MG
10 TABLET ORAL ONCE
Refills: 0 | Status: COMPLETED | OUTPATIENT
Start: 2024-01-24 | End: 2024-01-24

## 2024-01-24 RX ORDER — HYDRALAZINE HCL 50 MG
100 TABLET ORAL ONCE
Refills: 0 | Status: COMPLETED | OUTPATIENT
Start: 2024-01-24 | End: 2024-01-24

## 2024-01-24 RX ORDER — METOCLOPRAMIDE HCL 10 MG
10 TABLET ORAL ONCE
Refills: 0 | Status: COMPLETED | OUTPATIENT
Start: 2024-01-24 | End: 2024-01-24

## 2024-01-24 RX ORDER — ACETAMINOPHEN 500 MG
975 TABLET ORAL ONCE
Refills: 0 | Status: COMPLETED | OUTPATIENT
Start: 2024-01-24 | End: 2024-01-24

## 2024-01-24 RX ORDER — SODIUM CHLORIDE 9 MG/ML
1000 INJECTION INTRAMUSCULAR; INTRAVENOUS; SUBCUTANEOUS ONCE
Refills: 0 | Status: COMPLETED | OUTPATIENT
Start: 2024-01-24 | End: 2024-01-24

## 2024-01-24 RX ORDER — POTASSIUM CHLORIDE 20 MEQ
40 PACKET (EA) ORAL ONCE
Refills: 0 | Status: COMPLETED | OUTPATIENT
Start: 2024-01-24 | End: 2024-01-24

## 2024-01-24 RX ADMIN — Medication 40 MILLIEQUIVALENT(S): at 17:19

## 2024-01-24 RX ADMIN — Medication 975 MILLIGRAM(S): at 14:44

## 2024-01-24 RX ADMIN — SODIUM CHLORIDE 1000 MILLILITER(S): 9 INJECTION INTRAMUSCULAR; INTRAVENOUS; SUBCUTANEOUS at 14:45

## 2024-01-24 RX ADMIN — Medication 10 MILLIGRAM(S): at 17:20

## 2024-01-24 RX ADMIN — Medication 10 MILLIGRAM(S): at 14:45

## 2024-01-24 RX ADMIN — Medication 100 MILLIGRAM(S): at 15:41

## 2024-01-24 NOTE — ED PROVIDER NOTE - NSFOLLOWUPCLINICS_GEN_ALL_ED_FT
[facility]  Neurology  [address] [place] [address] [address] [place] [address]  Phone: [phone]  Fax:

## 2024-01-24 NOTE — ED PROVIDER NOTE - PATIENT PORTAL LINK FT
You can access the FollowMyHealth Patient Portal offered by [facility] by registering at the following website: [email or web address]. By joining [employer]’s FollowMyHealth portal, you will also be able to view your health information using other applications (apps) compatible with our system.

## 2024-01-24 NOTE — ED PROVIDER NOTE - NSFOLLOWUPINSTRUCTIONS_ED_ALL_ED_FT
You were seen in the ED for a headache. It is unclear what is causing this - please follow up with a neurologist in 2-3 days for further management.    Your blood pressure was elevated. You may need your blood pressure medications adjusted. Please follow up with your PCP in 2-3 days for further management.    For headaches, please take 1000mg Tylenol every 6 hours as needed.    Return to the ED if you experience any worsening or new symptoms or any symptoms that concern you, including fevers, chills, shortness of breath, chest pain, numbness, weakness, vomiting, vision changes.

## 2024-01-24 NOTE — ED PROVIDER NOTE - OBJECTIVE STATEMENT
pt is a 47 yo M with a h/o HTN who presents to the ED with a headache for 4 days and HTN. Patient states that the headache was gradual in onset, not sudden.  Severity waxes and wanes.  Most intense by his temples bilaterally.  Denies blurry vision, changes in vision, numbness or weakness in his arms or legs, trouble with speech or gait.  Denies fevers, chills, chest pain, shortness of breath, lightheadedness.  Has had similar symptoms many years ago, never saw a neurologist.  Has been taking his blood pressure medications as prescribed.  Denies any recent trauma or falls.

## 2024-01-24 NOTE — ED PROVIDER NOTE - PROGRESS NOTE DETAILS
spoke to pt with  [id number] and pt is feeling better, headache resolved. denies any other symptoms. discussed need for f/u with HTN  Patient was re-evaluated and doing well. Results, including any incidental findings, were discussed. Return precautions and follow up were discussed. Patient verbalized understanding.

## 2024-01-24 NOTE — ED PROVIDER NOTE - CLINICAL SUMMARY MEDICAL DECISION MAKING FREE TEXT BOX
Luis Antonio - pt is a 49 yo M with a h/o HTN who presents to the ED with a headache for 4 days and HTN. ddx includes but is not limited to tension headache, migraine, HA 2/2 HTN, low concern for brain bleed, no red flag signs or symptoms. neuro intact. will check cbc, cmp and give pain meds, fluids

## 2024-01-24 NOTE — ED PROVIDER NOTE - ATTENDING CONTRIBUTION TO CARE
Patient with history of hypertension on multiple medications, reports taking them today, presents emergency department 4 days of bitemporal headache.  No thunderclap component, gradual in onset, no associated blurry vision, numbness, tingling, weakness to extremities, nausea vomiting, fever, neck pain.  Has been taking Tylenol with minimal relief so presents to the ED.  On arrival noted to be hypertensive, may be secondary to headache as patient reports compliance with meds.  Suspect tension type headache, will check basic labs, treat headache and blood pressure and reassess. Exam overall well-appearing, no photophobia, nonfocal neurological exam.

## 2024-01-24 NOTE — ED ADULT TRIAGE NOTE - CHIEF COMPLAINT QUOTE
states " I have a head ache and high blood pressure since 4 days" h/o CAD, HTN stents , taking meds daily. takes lisinopril, Coreg, Amlodipine, Brilinta ASA daily. states he is been taking Tylenol and Motrin with no relief ,

## 2024-01-24 NOTE — ED PROVIDER NOTE - PHYSICAL EXAMINATION
Renea Salmon MD  GENERAL: Patient awake alert NAD.  HEENT: NC/AT, Moist mucous membranes, PERRL, EOMI.  LUNGS: CTAB, no wheezes or crackles.   CARDIAC: RRR, no m/r/g.    ABDOMEN: Soft, NT, ND, No rebound, guarding. No CVA tenderness.   EXT: No edema. No calf tenderness.   MSK: No spinal tenderness, no pain with movement, no deformities.  NEURO: A&Ox3. Moving all extremities. cn 2-12 intact. strength and sensation intact bilat ue and le. finger nose intact. speech normal   SKIN: Warm and dry. No rash.  PSYCH: Normal affect.

## 2024-03-17 PROBLEM — Z00.00 ENCOUNTER FOR PREVENTIVE HEALTH EXAMINATION: Status: ACTIVE | Noted: 2024-03-17

## 2024-04-26 NOTE — ED PROVIDER NOTE - ATTENDING SHARED VISIT SELECTORS
Reviewed last visit notes  Start Mounjaro 2.5 mg once weekly. After 4 weeks, increase to 5 mg. after 4 more weeks, increase to 7.5 mg. Max dose: 15 mg weekly.    History/Exam/Medical Decision Making

## 2024-05-29 ENCOUNTER — APPOINTMENT (OUTPATIENT)
Dept: CARDIOLOGY | Facility: CLINIC | Age: 49
End: 2024-05-29
Payer: MEDICAID

## 2024-05-29 ENCOUNTER — NON-APPOINTMENT (OUTPATIENT)
Age: 49
End: 2024-05-29

## 2024-05-29 VITALS
SYSTOLIC BLOOD PRESSURE: 196 MMHG | HEART RATE: 78 BPM | WEIGHT: 168 LBS | BODY MASS INDEX: 27.99 KG/M2 | DIASTOLIC BLOOD PRESSURE: 110 MMHG | OXYGEN SATURATION: 96 % | HEIGHT: 65 IN

## 2024-05-29 DIAGNOSIS — Z78.9 OTHER SPECIFIED HEALTH STATUS: ICD-10-CM

## 2024-05-29 DIAGNOSIS — I25.10 ATHEROSCLEROTIC HEART DISEASE OF NATIVE CORONARY ARTERY W/OUT ANGINA PECTORIS: ICD-10-CM

## 2024-05-29 DIAGNOSIS — Z82.49 FAMILY HISTORY OF ISCHEMIC HEART DISEASE AND OTHER DISEASES OF THE CIRCULATORY SYSTEM: ICD-10-CM

## 2024-05-29 DIAGNOSIS — Z83.3 FAMILY HISTORY OF DIABETES MELLITUS: ICD-10-CM

## 2024-05-29 PROCEDURE — 93000 ELECTROCARDIOGRAM COMPLETE: CPT

## 2024-05-29 PROCEDURE — 99205 OFFICE O/P NEW HI 60 MIN: CPT | Mod: 25

## 2024-05-29 RX ORDER — CARVEDILOL 25 MG/1
25 TABLET, FILM COATED ORAL TWICE DAILY
Refills: 0 | Status: ACTIVE | COMMUNITY

## 2024-05-29 RX ORDER — ASPIRIN ENTERIC COATED TABLETS 81 MG 81 MG/1
81 TABLET, DELAYED RELEASE ORAL DAILY
Qty: 90 | Refills: 3 | Status: ACTIVE | COMMUNITY

## 2024-05-29 RX ORDER — ATORVASTATIN CALCIUM 80 MG/1
80 TABLET, FILM COATED ORAL
Qty: 90 | Refills: 1 | Status: ACTIVE | COMMUNITY

## 2024-05-29 RX ORDER — CALCIUM CARBONATE 260MG(650)
TABLET,CHEWABLE ORAL
Refills: 0 | Status: ACTIVE | COMMUNITY

## 2024-05-29 RX ORDER — LISINOPRIL 40 MG/1
40 TABLET ORAL
Qty: 90 | Refills: 3 | Status: ACTIVE | COMMUNITY

## 2024-05-29 RX ORDER — AMLODIPINE BESYLATE AND BENAZEPRIL HYDROCHLORIDE 10; 40 MG/1; MG/1
10-40 CAPSULE ORAL
Refills: 0 | Status: ACTIVE | COMMUNITY

## 2024-05-29 RX ORDER — TICAGRELOR 90 MG/1
90 TABLET ORAL TWICE DAILY
Qty: 60 | Refills: 0 | Status: DISCONTINUED | COMMUNITY
End: 2024-05-29

## 2024-05-31 PROBLEM — I25.10 CORONARY ARTERIOSCLEROSIS: Status: ACTIVE | Noted: 2024-05-29

## 2024-05-31 LAB
ALBUMIN SERPL ELPH-MCNC: 4.4 G/DL
ALDOSTERONE/RENIN RATIO: NORMAL RATIO
ALP BLD-CCNC: 101 U/L
ALT SERPL-CCNC: 32 U/L
ANION GAP SERPL CALC-SCNC: 11 MMOL/L
AST SERPL-CCNC: 28 U/L
BILIRUB SERPL-MCNC: 0.3 MG/DL
BUN SERPL-MCNC: 13 MG/DL
CALCIUM SERPL-MCNC: 9.3 MG/DL
CHLORIDE SERPL-SCNC: 102 MMOL/L
CHOLEST SERPL-MCNC: 215 MG/DL
CO2 SERPL-SCNC: 27 MMOL/L
CREAT SERPL-MCNC: 1.4 MG/DL
EGFR: 62 ML/MIN/1.73M2
GLUCOSE SERPL-MCNC: 126 MG/DL
HDLC SERPL-MCNC: 43 MG/DL
LDLC SERPL CALC-MCNC: 137 MG/DL
NONHDLC SERPL-MCNC: 172 MG/DL
POTASSIUM SERPL-SCNC: 3.6 MMOL/L
PROT SERPL-MCNC: 6.7 G/DL
SODIUM SERPL-SCNC: 139 MMOL/L
TRIGL SERPL-MCNC: 194 MG/DL
TSH SERPL-ACNC: 1.88 UIU/ML

## 2024-05-31 NOTE — ASSESSMENT
[FreeTextEntry1] : #uncontrolled hypertension: on three different classes of medications including a diuretic. Need to confirm secondary work-up.   -labs today -renal artery duplex if not done yet -will call on Monday to discuss results from prior testing -f/u 2 weeks to discuss if candidate for renal artery denervation -24 hour ambulatory BP monitor  #carotid artery stenosis -get MRA imaging -RF modification and GDMT

## 2024-05-31 NOTE — REASON FOR VISIT
[Consultation] : a consultation regarding [Hypertension] : hypertension [Peripheral Vascular Disease] : peripheral vascular disease [FreeTextEntry1] : 48 yo M with PMHx of difficult to control hypertension, coronary artery disease s/p PCI to OM, carotid artery stenosis, family history of stroke referred for renal denervation evaluation.   Meds: HCTZ, Nifedipine, and Metoprolol  (to confirm doses with pharmacy, [facility] [phone]). BP high in the office and at home 180-190/80-90s.  Takes medications. Will need to make sure he has a full secondary work-up.   Family hx of stroke DEDE 50-69%, LICA 70-99% on recent carotid artery ultrasound; MRA was done and no results to review  No history of TIA/stroke.  [phone] (wife, Eliza Ochoa)

## 2024-05-31 NOTE — PHYSICAL EXAM
[Eyelids - No Xanthelasma] : the eyelids demonstrated no xanthelasmas [No Oral Cyanosis] : no oral cyanosis [Heart Rate And Rhythm] : heart rate and rhythm were normal [Heart Sounds] : normal S1 and S2 [Murmurs] : no murmurs present [Arterial Pulses Normal] : the arterial pulses were normal [Veins - Varicosity Changes] : no varicosital changes were noted in the lower extremities [FreeTextEntry1] : CTAB [Abnormal Walk] : normal gait [] : no ischemic changes [No Skin Ulcers] : no skin ulcer [Oriented To Time, Place, And Person] : oriented to person, place, and time [Impaired Insight] : insight and judgment were intact [Affect] : the affect was normal

## 2024-06-07 LAB
CORTICOSTEROID BIND GLOBULIN: 2.5 MG/DL
CORTIS SERPL-MCNC: 13 UG/DL
CORTISOL, FREE: 0.87 UG/DL
PFCX: 6.7 %

## 2024-06-12 ENCOUNTER — APPOINTMENT (OUTPATIENT)
Dept: CARDIOLOGY | Facility: CLINIC | Age: 49
End: 2024-06-12
Payer: MEDICAID

## 2024-06-12 VITALS
DIASTOLIC BLOOD PRESSURE: 100 MMHG | HEIGHT: 65 IN | SYSTOLIC BLOOD PRESSURE: 171 MMHG | WEIGHT: 170 LBS | BODY MASS INDEX: 28.32 KG/M2 | OXYGEN SATURATION: 97 % | HEART RATE: 80 BPM

## 2024-06-12 DIAGNOSIS — E78.5 HYPERLIPIDEMIA, UNSPECIFIED: ICD-10-CM

## 2024-06-12 PROCEDURE — 99214 OFFICE O/P EST MOD 30 MIN: CPT

## 2024-06-14 PROBLEM — E78.5 HYPERLIPIDEMIA: Status: ACTIVE | Noted: 2024-05-29

## 2024-06-14 NOTE — ASSESSMENT
[FreeTextEntry1] : #uncontrolled hypertension: on three different classes of medications including a diuretic. Need to confirm secondary work-up.  Mildly abnormal Clay and renin values, will need to repeat. renal artery stent patent   -repeat labs to be done -will likely need a CTA done  -f/u to discuss if candidate for renal artery denervation; will need to have new consent translated in Samoan   #carotid artery stenosis -get MRA imaging (still waiting) -RF modification and GDMT

## 2024-06-14 NOTE — PHYSICAL EXAM
[Eyelids - No Xanthelasma] : the eyelids demonstrated no xanthelasmas [No Oral Cyanosis] : no oral cyanosis [FreeTextEntry1] : CTAB [Heart Rate And Rhythm] : heart rate and rhythm were normal [Heart Sounds] : normal S1 and S2 [Murmurs] : no murmurs present [Arterial Pulses Normal] : the arterial pulses were normal [Veins - Varicosity Changes] : no varicosital changes were noted in the lower extremities [Abnormal Walk] : normal gait [] : no ischemic changes [No Skin Ulcers] : no skin ulcer [Oriented To Time, Place, And Person] : oriented to person, place, and time [Affect] : the affect was normal [Impaired Insight] : insight and judgment were intact

## 2024-06-14 NOTE — REASON FOR VISIT
[Follow-Up - Clinic] : a clinic follow-up of [Peripheral Vascular Disease] : peripheral vascular disease [FreeTextEntry1] : 48 yo M with PMHx of difficult to control hypertension, coronary artery disease s/p PCI to OM, carotid artery stenosis, family history of stroke referred for renal denervation evaluation.   Renal artery duplex normal, patent stent.   Top normal aldosterone levels, low renin. Will need to repeat with morning labs. If still abnormal can do CTA to evaluate adrenal glands. He is on an ACEi. Supposedly prior normal secondary work-up.  No [place] consent today.   Prior hx: Meds: HCTZ, Nifedipine, and Metoprolol  (to confirm doses with pharmacy, [facility] [phone]). BP high in the office and at home 180-190/80-90s.  Takes medications. Will need to make sure he has a full secondary work-up.   Family hx of stroke DEDE 50-69%, LICA 70-99% on recent carotid artery ultrasound; MRA was done and no results to review  No history of TIA/stroke.  [phone] (wife, Eliza Ochoa)

## 2024-06-26 ENCOUNTER — APPOINTMENT (OUTPATIENT)
Dept: CARDIOLOGY | Facility: CLINIC | Age: 49
End: 2024-06-26
Payer: MEDICAID

## 2024-06-26 VITALS
HEIGHT: 65 IN | SYSTOLIC BLOOD PRESSURE: 166 MMHG | OXYGEN SATURATION: 95 % | DIASTOLIC BLOOD PRESSURE: 100 MMHG | BODY MASS INDEX: 28.32 KG/M2 | HEART RATE: 90 BPM | WEIGHT: 170 LBS

## 2024-06-26 DIAGNOSIS — I10 ESSENTIAL (PRIMARY) HYPERTENSION: ICD-10-CM

## 2024-06-26 PROCEDURE — 99215 OFFICE O/P EST HI 40 MIN: CPT

## 2024-06-28 LAB — ALDOSTERONE/RENIN RATIO: NORMAL RATIO

## 2024-07-01 PROBLEM — I10 ESSENTIAL HYPERTENSION: Status: ACTIVE | Noted: 2024-05-29

## 2024-08-23 ENCOUNTER — OUTPATIENT (OUTPATIENT)
Dept: OUTPATIENT SERVICES | Facility: HOSPITAL | Age: 49
LOS: 1 days | End: 2024-08-23
Payer: MEDICAID

## 2024-08-23 ENCOUNTER — APPOINTMENT (OUTPATIENT)
Dept: CT IMAGING | Facility: CLINIC | Age: 49
End: 2024-08-23

## 2024-08-23 DIAGNOSIS — I10 ESSENTIAL (PRIMARY) HYPERTENSION: ICD-10-CM

## 2024-08-23 PROCEDURE — 74170 CT ABD WO CNTRST FLWD CNTRST: CPT | Mod: 26

## 2024-08-23 PROCEDURE — 74170 CT ABD WO CNTRST FLWD CNTRST: CPT

## 2024-11-06 NOTE — ED ADULT NURSE NOTE - NSFALLRSKHARMRISK_ED_ALL_ED
no